# Patient Record
Sex: FEMALE | Race: WHITE | Employment: OTHER | URBAN - METROPOLITAN AREA
[De-identification: names, ages, dates, MRNs, and addresses within clinical notes are randomized per-mention and may not be internally consistent; named-entity substitution may affect disease eponyms.]

---
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
## 2017-05-02 PROBLEM — M25.562 LEFT KNEE PAIN: Status: ACTIVE | Noted: 2017-05-02

## 2017-05-09 PROBLEM — G20 PARKINSON'S DISEASE (HCC): Status: ACTIVE | Noted: 2017-05-09

## 2017-05-09 PROBLEM — G20.A1 PARKINSON'S DISEASE: Status: ACTIVE | Noted: 2017-05-09

## 2018-05-01 ENCOUNTER — HOSPITAL ENCOUNTER (OUTPATIENT)
Age: 83
Discharge: HOME | End: 2018-05-03
Payer: MEDICARE

## 2018-05-01 ENCOUNTER — NURSE ONLY (OUTPATIENT)
Dept: FAMILY MEDICINE CLINIC | Age: 83
End: 2018-05-01
Payer: MEDICARE

## 2018-05-01 DIAGNOSIS — I10 ESSENTIAL HYPERTENSION: ICD-10-CM

## 2018-05-01 DIAGNOSIS — E78.5 HYPERLIPIDEMIA, UNSPECIFIED HYPERLIPIDEMIA TYPE: ICD-10-CM

## 2018-05-01 LAB
ALBUMIN SERPL-MCNC: 4.1 G/DL (ref 3.5–5.2)
ALP BLD-CCNC: 58 U/L (ref 35–104)
ALT SERPL-CCNC: <5 U/L (ref 0–32)
ANION GAP SERPL CALCULATED.3IONS-SCNC: 15 MMOL/L (ref 7–16)
AST SERPL-CCNC: 26 U/L (ref 0–31)
BASOPHILS ABSOLUTE: 0.05 E9/L (ref 0–0.2)
BASOPHILS RELATIVE PERCENT: 0.3 % (ref 0–2)
BILIRUB SERPL-MCNC: 0.5 MG/DL (ref 0–1.2)
BUN BLDV-MCNC: 14 MG/DL (ref 8–23)
CALCIUM SERPL-MCNC: 9.5 MG/DL (ref 8.6–10.2)
CHLORIDE BLD-SCNC: 97 MMOL/L (ref 98–107)
CHOLESTEROL, TOTAL: 153 MG/DL (ref 0–199)
CO2: 27 MMOL/L (ref 22–29)
CREAT SERPL-MCNC: 0.8 MG/DL (ref 0.5–1)
EOSINOPHILS ABSOLUTE: 0.16 E9/L (ref 0.05–0.5)
EOSINOPHILS RELATIVE PERCENT: 1.1 % (ref 0–6)
GFR AFRICAN AMERICAN: >60
GFR NON-AFRICAN AMERICAN: >60 ML/MIN/1.73
GLUCOSE BLD-MCNC: 91 MG/DL (ref 74–109)
HCT VFR BLD CALC: 37.3 % (ref 34–48)
HDLC SERPL-MCNC: 69 MG/DL
HEMOGLOBIN: 11.8 G/DL (ref 11.5–15.5)
IMMATURE GRANULOCYTES #: 0.08 E9/L
IMMATURE GRANULOCYTES %: 0.5 % (ref 0–5)
LDL CHOLESTEROL CALCULATED: 68 MG/DL (ref 0–99)
LYMPHOCYTES ABSOLUTE: 0.79 E9/L (ref 1.5–4)
LYMPHOCYTES RELATIVE PERCENT: 5.2 % (ref 20–42)
MCH RBC QN AUTO: 31.6 PG (ref 26–35)
MCHC RBC AUTO-ENTMCNC: 31.6 % (ref 32–34.5)
MCV RBC AUTO: 99.7 FL (ref 80–99.9)
MONOCYTES ABSOLUTE: 1.11 E9/L (ref 0.1–0.95)
MONOCYTES RELATIVE PERCENT: 7.4 % (ref 2–12)
NEUTROPHILS ABSOLUTE: 12.9 E9/L (ref 1.8–7.3)
NEUTROPHILS RELATIVE PERCENT: 85.5 % (ref 43–80)
PDW BLD-RTO: 12.8 FL (ref 11.5–15)
PLATELET # BLD: 225 E9/L (ref 130–450)
PMV BLD AUTO: 9.5 FL (ref 7–12)
POTASSIUM SERPL-SCNC: 4.2 MMOL/L (ref 3.5–5)
RBC # BLD: 3.74 E12/L (ref 3.5–5.5)
SODIUM BLD-SCNC: 139 MMOL/L (ref 132–146)
TOTAL PROTEIN: 7.1 G/DL (ref 6.4–8.3)
TRIGL SERPL-MCNC: 80 MG/DL (ref 0–149)
VITAMIN D 25-HYDROXY: 51 NG/ML (ref 30–100)
VLDLC SERPL CALC-MCNC: 16 MG/DL
WBC # BLD: 15.1 E9/L (ref 4.5–11.5)

## 2018-05-01 PROCEDURE — 80061 LIPID PANEL: CPT

## 2018-05-01 PROCEDURE — 82306 VITAMIN D 25 HYDROXY: CPT

## 2018-05-01 PROCEDURE — 85025 COMPLETE CBC W/AUTO DIFF WBC: CPT

## 2018-05-01 PROCEDURE — 80053 COMPREHEN METABOLIC PANEL: CPT

## 2018-05-01 PROCEDURE — 36415 COLL VENOUS BLD VENIPUNCTURE: CPT | Performed by: FAMILY MEDICINE

## 2018-05-03 ENCOUNTER — TELEPHONE (OUTPATIENT)
Dept: FAMILY MEDICINE CLINIC | Age: 83
End: 2018-05-03

## 2018-05-08 ENCOUNTER — OFFICE VISIT (OUTPATIENT)
Dept: FAMILY MEDICINE CLINIC | Age: 83
End: 2018-05-08
Payer: MEDICARE

## 2018-05-08 VITALS
TEMPERATURE: 98.5 F | SYSTOLIC BLOOD PRESSURE: 139 MMHG | RESPIRATION RATE: 18 BRPM | HEART RATE: 76 BPM | HEIGHT: 62 IN | WEIGHT: 119 LBS | DIASTOLIC BLOOD PRESSURE: 72 MMHG | OXYGEN SATURATION: 100 % | BODY MASS INDEX: 21.9 KG/M2

## 2018-05-08 DIAGNOSIS — E78.5 HYPERLIPIDEMIA, UNSPECIFIED HYPERLIPIDEMIA TYPE: ICD-10-CM

## 2018-05-08 DIAGNOSIS — R19.7 DIARRHEA OF PRESUMED INFECTIOUS ORIGIN: Primary | ICD-10-CM

## 2018-05-08 DIAGNOSIS — D72.829 LEUKOCYTOSIS, UNSPECIFIED TYPE: ICD-10-CM

## 2018-05-08 DIAGNOSIS — I10 ESSENTIAL HYPERTENSION: ICD-10-CM

## 2018-05-08 DIAGNOSIS — E55.9 VITAMIN D DEFICIENCY: ICD-10-CM

## 2018-05-08 DIAGNOSIS — G20 PARKINSON'S DISEASE (HCC): ICD-10-CM

## 2018-05-08 PROCEDURE — G8420 CALC BMI NORM PARAMETERS: HCPCS | Performed by: FAMILY MEDICINE

## 2018-05-08 PROCEDURE — 4040F PNEUMOC VAC/ADMIN/RCVD: CPT | Performed by: FAMILY MEDICINE

## 2018-05-08 PROCEDURE — G8427 DOCREV CUR MEDS BY ELIG CLIN: HCPCS | Performed by: FAMILY MEDICINE

## 2018-05-08 PROCEDURE — 1123F ACP DISCUSS/DSCN MKR DOCD: CPT | Performed by: FAMILY MEDICINE

## 2018-05-08 PROCEDURE — G8399 PT W/DXA RESULTS DOCUMENT: HCPCS | Performed by: FAMILY MEDICINE

## 2018-05-08 PROCEDURE — 99214 OFFICE O/P EST MOD 30 MIN: CPT | Performed by: FAMILY MEDICINE

## 2018-05-08 PROCEDURE — 1090F PRES/ABSN URINE INCON ASSESS: CPT | Performed by: FAMILY MEDICINE

## 2018-05-08 PROCEDURE — 1036F TOBACCO NON-USER: CPT | Performed by: FAMILY MEDICINE
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
## 2018-06-12 ENCOUNTER — OFFICE VISIT (OUTPATIENT)
Dept: FAMILY MEDICINE CLINIC | Age: 83
End: 2018-06-12
Payer: MEDICARE

## 2018-06-12 VITALS
DIASTOLIC BLOOD PRESSURE: 77 MMHG | SYSTOLIC BLOOD PRESSURE: 131 MMHG | BODY MASS INDEX: 21.71 KG/M2 | WEIGHT: 118 LBS | RESPIRATION RATE: 18 BRPM | HEART RATE: 72 BPM | HEIGHT: 62 IN | OXYGEN SATURATION: 98 % | TEMPERATURE: 98.4 F

## 2018-06-12 DIAGNOSIS — J20.9 ACUTE BRONCHITIS, UNSPECIFIED ORGANISM: Primary | ICD-10-CM

## 2018-06-12 PROCEDURE — G8399 PT W/DXA RESULTS DOCUMENT: HCPCS | Performed by: FAMILY MEDICINE

## 2018-06-12 PROCEDURE — 1036F TOBACCO NON-USER: CPT | Performed by: FAMILY MEDICINE

## 2018-06-12 PROCEDURE — 1090F PRES/ABSN URINE INCON ASSESS: CPT | Performed by: FAMILY MEDICINE

## 2018-06-12 PROCEDURE — G8420 CALC BMI NORM PARAMETERS: HCPCS | Performed by: FAMILY MEDICINE

## 2018-06-12 PROCEDURE — G8427 DOCREV CUR MEDS BY ELIG CLIN: HCPCS | Performed by: FAMILY MEDICINE

## 2018-06-12 PROCEDURE — 99213 OFFICE O/P EST LOW 20 MIN: CPT | Performed by: FAMILY MEDICINE

## 2018-06-12 PROCEDURE — 4040F PNEUMOC VAC/ADMIN/RCVD: CPT | Performed by: FAMILY MEDICINE

## 2018-06-12 PROCEDURE — 1123F ACP DISCUSS/DSCN MKR DOCD: CPT | Performed by: FAMILY MEDICINE

## 2018-06-12 RX ORDER — LEVOFLOXACIN 250 MG/1
250 TABLET ORAL DAILY
Qty: 7 TABLET | Refills: 0 | Status: SHIPPED | OUTPATIENT
Start: 2018-06-12 | End: 2018-06-19

## 2018-06-12 RX ORDER — METHYLPREDNISOLONE 4 MG/1
TABLET ORAL
Qty: 1 KIT | Refills: 0 | Status: SHIPPED | OUTPATIENT
Start: 2018-06-12 | End: 2018-06-18

## 2018-09-14 ENCOUNTER — OFFICE VISIT (OUTPATIENT)
Dept: FAMILY MEDICINE CLINIC | Age: 83
End: 2018-09-14
Payer: MEDICARE

## 2018-09-14 VITALS
WEIGHT: 115 LBS | HEIGHT: 62 IN | HEART RATE: 77 BPM | OXYGEN SATURATION: 100 % | DIASTOLIC BLOOD PRESSURE: 74 MMHG | RESPIRATION RATE: 18 BRPM | TEMPERATURE: 98.1 F | BODY MASS INDEX: 21.16 KG/M2 | SYSTOLIC BLOOD PRESSURE: 148 MMHG

## 2018-09-14 DIAGNOSIS — I10 ESSENTIAL HYPERTENSION: Primary | ICD-10-CM

## 2018-09-14 DIAGNOSIS — G47.9 DIFFICULTY SLEEPING: ICD-10-CM

## 2018-09-14 DIAGNOSIS — E78.5 HYPERLIPIDEMIA, UNSPECIFIED HYPERLIPIDEMIA TYPE: ICD-10-CM

## 2018-09-14 DIAGNOSIS — R73.01 IMPAIRED FASTING GLUCOSE: ICD-10-CM

## 2018-09-14 DIAGNOSIS — R06.02 SOB (SHORTNESS OF BREATH): ICD-10-CM

## 2018-09-14 PROCEDURE — G8427 DOCREV CUR MEDS BY ELIG CLIN: HCPCS | Performed by: FAMILY MEDICINE

## 2018-09-14 PROCEDURE — 1101F PT FALLS ASSESS-DOCD LE1/YR: CPT | Performed by: FAMILY MEDICINE

## 2018-09-14 PROCEDURE — 1123F ACP DISCUSS/DSCN MKR DOCD: CPT | Performed by: FAMILY MEDICINE

## 2018-09-14 PROCEDURE — 1090F PRES/ABSN URINE INCON ASSESS: CPT | Performed by: FAMILY MEDICINE

## 2018-09-14 PROCEDURE — 1036F TOBACCO NON-USER: CPT | Performed by: FAMILY MEDICINE

## 2018-09-14 PROCEDURE — G8420 CALC BMI NORM PARAMETERS: HCPCS | Performed by: FAMILY MEDICINE

## 2018-09-14 PROCEDURE — 93000 ELECTROCARDIOGRAM COMPLETE: CPT | Performed by: FAMILY MEDICINE

## 2018-09-14 PROCEDURE — G8399 PT W/DXA RESULTS DOCUMENT: HCPCS | Performed by: FAMILY MEDICINE

## 2018-09-14 PROCEDURE — 4040F PNEUMOC VAC/ADMIN/RCVD: CPT | Performed by: FAMILY MEDICINE

## 2018-09-14 PROCEDURE — 99214 OFFICE O/P EST MOD 30 MIN: CPT | Performed by: FAMILY MEDICINE

## 2018-09-14 RX ORDER — ALBUTEROL SULFATE 90 UG/1
2 AEROSOL, METERED RESPIRATORY (INHALATION) EVERY 6 HOURS PRN
Qty: 1 INHALER | Refills: 3 | Status: SHIPPED | OUTPATIENT
Start: 2018-09-14 | End: 2019-10-07 | Stop reason: SDUPTHER

## 2018-09-14 RX ORDER — FLUTICASONE FUROATE AND VILANTEROL 200; 25 UG/1; UG/1
1 POWDER RESPIRATORY (INHALATION) DAILY
Qty: 60 EACH | Refills: 3 | Status: SHIPPED | OUTPATIENT
Start: 2018-09-14 | End: 2018-10-08

## 2018-09-14 ASSESSMENT — PATIENT HEALTH QUESTIONNAIRE - PHQ9
2. FEELING DOWN, DEPRESSED OR HOPELESS: 0
1. LITTLE INTEREST OR PLEASURE IN DOING THINGS: 0
SUM OF ALL RESPONSES TO PHQ QUESTIONS 1-9: 0
SUM OF ALL RESPONSES TO PHQ9 QUESTIONS 1 & 2: 0
SUM OF ALL RESPONSES TO PHQ QUESTIONS 1-9: 0

## 2018-09-14 NOTE — PROGRESS NOTES
undiagnosed pathology, especially cancer, as well as protecting against potentially harmful/life threatening disease. Patient and/or guardian verbalizes understanding and agrees with above counseling, assessment and plan. All questions answered.

## 2018-09-15 ENCOUNTER — HOSPITAL ENCOUNTER (OUTPATIENT)
Age: 83
Discharge: HOME | End: 2018-09-15
Payer: MEDICARE

## 2018-09-15 DIAGNOSIS — E55.9 VITAMIN D DEFICIENCY: ICD-10-CM

## 2018-09-15 DIAGNOSIS — E78.5 HYPERLIPIDEMIA, UNSPECIFIED HYPERLIPIDEMIA TYPE: ICD-10-CM

## 2018-09-15 DIAGNOSIS — R73.01 IMPAIRED FASTING GLUCOSE: ICD-10-CM

## 2018-09-15 DIAGNOSIS — R06.02 SOB (SHORTNESS OF BREATH): ICD-10-CM

## 2018-09-15 DIAGNOSIS — I10 ESSENTIAL HYPERTENSION: ICD-10-CM

## 2018-09-15 LAB
ALBUMIN SERPL-MCNC: 4.4 G/DL (ref 3.5–5.2)
ALP BLD-CCNC: 67 U/L (ref 35–104)
ALT SERPL-CCNC: 15 U/L (ref 0–32)
ANION GAP SERPL CALCULATED.3IONS-SCNC: 11 MMOL/L (ref 7–16)
AST SERPL-CCNC: 29 U/L (ref 0–31)
BASOPHILS ABSOLUTE: 0.03 E9/L (ref 0–0.2)
BASOPHILS RELATIVE PERCENT: 0.4 % (ref 0–2)
BILIRUB SERPL-MCNC: 0.5 MG/DL (ref 0–1.2)
BUN BLDV-MCNC: 14 MG/DL (ref 8–23)
CALCIUM SERPL-MCNC: 9.7 MG/DL (ref 8.6–10.2)
CHLORIDE BLD-SCNC: 98 MMOL/L (ref 98–107)
CHOLESTEROL, TOTAL: 171 MG/DL (ref 0–199)
CO2: 29 MMOL/L (ref 22–29)
CREAT SERPL-MCNC: 0.7 MG/DL (ref 0.5–1)
EOSINOPHILS ABSOLUTE: 0.12 E9/L (ref 0.05–0.5)
EOSINOPHILS RELATIVE PERCENT: 1.8 % (ref 0–6)
GFR AFRICAN AMERICAN: >60
GFR NON-AFRICAN AMERICAN: >60 ML/MIN/1.73
GLUCOSE BLD-MCNC: 99 MG/DL (ref 74–109)
HBA1C MFR BLD: 5.6 % (ref 4–5.6)
HCT VFR BLD CALC: 37.8 % (ref 34–48)
HDLC SERPL-MCNC: 85 MG/DL
HEMOGLOBIN: 12.5 G/DL (ref 11.5–15.5)
IMMATURE GRANULOCYTES #: 0.03 E9/L
IMMATURE GRANULOCYTES %: 0.4 % (ref 0–5)
LDL CHOLESTEROL CALCULATED: 65 MG/DL (ref 0–99)
LYMPHOCYTES ABSOLUTE: 1.03 E9/L (ref 1.5–4)
LYMPHOCYTES RELATIVE PERCENT: 15.1 % (ref 20–42)
MCH RBC QN AUTO: 31.5 PG (ref 26–35)
MCHC RBC AUTO-ENTMCNC: 33.1 % (ref 32–34.5)
MCV RBC AUTO: 95.2 FL (ref 80–99.9)
MONOCYTES ABSOLUTE: 0.49 E9/L (ref 0.1–0.95)
MONOCYTES RELATIVE PERCENT: 7.2 % (ref 2–12)
NEUTROPHILS ABSOLUTE: 5.12 E9/L (ref 1.8–7.3)
NEUTROPHILS RELATIVE PERCENT: 75.1 % (ref 43–80)
PDW BLD-RTO: 12.4 FL (ref 11.5–15)
PLATELET # BLD: 199 E9/L (ref 130–450)
PMV BLD AUTO: 9.7 FL (ref 7–12)
POTASSIUM SERPL-SCNC: 4.5 MMOL/L (ref 3.5–5)
PRO-BNP: 186 PG/ML (ref 0–450)
RBC # BLD: 3.97 E12/L (ref 3.5–5.5)
SODIUM BLD-SCNC: 138 MMOL/L (ref 132–146)
T4 FREE: 1.37 NG/DL (ref 0.93–1.7)
TOTAL PROTEIN: 7.5 G/DL (ref 6.4–8.3)
TRIGL SERPL-MCNC: 105 MG/DL (ref 0–149)
TSH SERPL DL<=0.05 MIU/L-ACNC: 4.97 UIU/ML (ref 0.27–4.2)
VITAMIN D 25-HYDROXY: 60 NG/ML (ref 30–100)
VLDLC SERPL CALC-MCNC: 21 MG/DL
WBC # BLD: 6.8 E9/L (ref 4.5–11.5)

## 2018-09-15 PROCEDURE — 80053 COMPREHEN METABOLIC PANEL: CPT

## 2018-09-15 PROCEDURE — 83036 HEMOGLOBIN GLYCOSYLATED A1C: CPT

## 2018-09-15 PROCEDURE — 84439 ASSAY OF FREE THYROXINE: CPT

## 2018-09-15 PROCEDURE — 36415 COLL VENOUS BLD VENIPUNCTURE: CPT

## 2018-09-15 PROCEDURE — 83880 ASSAY OF NATRIURETIC PEPTIDE: CPT

## 2018-09-15 PROCEDURE — 82306 VITAMIN D 25 HYDROXY: CPT

## 2018-09-15 PROCEDURE — 85025 COMPLETE CBC W/AUTO DIFF WBC: CPT

## 2018-09-15 PROCEDURE — 80061 LIPID PANEL: CPT

## 2018-09-15 PROCEDURE — 84443 ASSAY THYROID STIM HORMONE: CPT

## 2018-09-17 RX ORDER — LEVOTHYROXINE SODIUM 0.03 MG/1
25 TABLET ORAL DAILY
Qty: 30 TABLET | Refills: 3 | Status: SHIPPED | OUTPATIENT
Start: 2018-09-17 | End: 2018-10-23

## 2018-09-21 ENCOUNTER — TELEPHONE (OUTPATIENT)
Dept: FAMILY MEDICINE CLINIC | Age: 83
End: 2018-09-21

## 2018-09-21 DIAGNOSIS — R06.02 SOB (SHORTNESS OF BREATH): Primary | ICD-10-CM

## 2018-09-21 NOTE — TELEPHONE ENCOUNTER
Patient doesn't feel the brio and inhaler are working well(in house or resting, okay)any time going outside she is short of breath

## 2018-10-05 ENCOUNTER — HOSPITAL ENCOUNTER (OUTPATIENT)
Dept: CARDIOLOGY | Age: 83
Discharge: HOME | End: 2018-10-05
Payer: MEDICARE

## 2018-10-05 VITALS
WEIGHT: 115 LBS | DIASTOLIC BLOOD PRESSURE: 78 MMHG | HEART RATE: 93 BPM | SYSTOLIC BLOOD PRESSURE: 118 MMHG | HEIGHT: 62 IN | BODY MASS INDEX: 21.16 KG/M2

## 2018-10-05 DIAGNOSIS — R06.02 SOB (SHORTNESS OF BREATH): ICD-10-CM

## 2018-10-05 DIAGNOSIS — R06.02 SHORTNESS OF BREATH: Primary | ICD-10-CM

## 2018-10-05 LAB
LV EF: 68 %
LVEF MODALITY: NORMAL

## 2018-10-05 PROCEDURE — 6360000002 HC RX W HCPCS: Performed by: FAMILY MEDICINE

## 2018-10-05 PROCEDURE — A9500 TC99M SESTAMIBI: HCPCS | Performed by: INTERNAL MEDICINE

## 2018-10-05 PROCEDURE — 93017 CV STRESS TEST TRACING ONLY: CPT

## 2018-10-05 PROCEDURE — 3430000000 HC RX DIAGNOSTIC RADIOPHARMACEUTICAL: Performed by: INTERNAL MEDICINE

## 2018-10-05 PROCEDURE — 93306 TTE W/DOPPLER COMPLETE: CPT

## 2018-10-05 PROCEDURE — 2580000003 HC RX 258: Performed by: INTERNAL MEDICINE

## 2018-10-05 PROCEDURE — 78452 HT MUSCLE IMAGE SPECT MULT: CPT

## 2018-10-05 RX ORDER — SODIUM CHLORIDE 0.9 % (FLUSH) 0.9 %
10 SYRINGE (ML) INJECTION PRN
Status: DISCONTINUED | OUTPATIENT
Start: 2018-10-05 | End: 2018-10-06 | Stop reason: HOSPADM

## 2018-10-05 RX ADMIN — Medication 10 ML: at 08:56

## 2018-10-05 RX ADMIN — Medication 10 ML: at 10:23

## 2018-10-05 RX ADMIN — Medication 30.2 MILLICURIE: at 10:22

## 2018-10-05 RX ADMIN — Medication 10 ML: at 10:22

## 2018-10-05 RX ADMIN — REGADENOSON 0.4 MG: 0.08 INJECTION, SOLUTION INTRAVENOUS at 10:22

## 2018-10-05 RX ADMIN — Medication 10.3 MILLICURIE: at 08:55

## 2018-10-05 NOTE — PROCEDURES
[address] [facility]   [address] [place] [address]  [phone]                Pharmacologic Stress Nuclear Gated SPECT Study    Name: [facility] Account Number: [de-identified]    :  1935          Sex: female         Date of Study:  10/5/2018    Height: 5' 2\" (157.5 cm)         Weight: 115 lb (52.2 kg)     Ordering Provider: Droa Neves MD          PCP: Dora Neves MD      Cardiologist: none              Interpreting Physician: Gerda Yoo MD  _________________________________________________________________________________    Indication:   Detecting the presence and location of coronary artery disease    Clinical History:   Patient has no known history of coronary artery disease. Resting ECG:    NM int 0,16 sec, QRS int 0.08 sec, QT int  sec; HR 71 bpm  Normal sinus Normal EKG    Procedure:   Pharmacologic stress testing was performed with regadenoson 0.4 mg for 15 seconds. .  The heart rate was 71 at baseline and alonso to 96 beats during the infusion. This corresponds to 70% of maximum predicted heart rate. The blood pressure at baseline was 120/78 and blood pressure at the end of infusion was 118/78. Blood pressure response was normal during the stress procedure. The patient experienced shortness of breath  during the infusion, which relieved. ECG during the infusion did not change. IMAGING: Myocardial perfusion imaging was performed at rest 30-35 minutes following the intravenous injection of 10.3 mCi of (Tc-Sestamibi) followed by 10 ml of Normal Saline. As per infusion protocol, the patient was injected intravenously with 30.2 mCi of (Tc-Sestamibi) followed by 10 ml of Normal Saline. Gated post-stress tomographic imaging was performed 20-25 minutes after stress.      FINDINGS: The overall quality of the study was good    Left ventricular cavity size was noted to be normal.    Rotational analog

## 2018-10-08 DIAGNOSIS — R06.02 SOB (SHORTNESS OF BREATH): Primary | ICD-10-CM

## 2018-10-08 RX ORDER — METHYLPREDNISOLONE 4 MG/1
TABLET ORAL
Qty: 1 KIT | Refills: 0 | Status: SHIPPED | OUTPATIENT
Start: 2018-10-08 | End: 2018-10-14

## 2018-10-15 ENCOUNTER — HOSPITAL ENCOUNTER (OUTPATIENT)
Dept: PULMONOLOGY | Age: 83
Discharge: HOME | End: 2018-10-15
Payer: MEDICARE

## 2018-10-15 DIAGNOSIS — R06.02 SOB (SHORTNESS OF BREATH): ICD-10-CM

## 2018-10-15 PROCEDURE — 94729 DIFFUSING CAPACITY: CPT

## 2018-10-15 PROCEDURE — 94726 PLETHYSMOGRAPHY LUNG VOLUMES: CPT

## 2018-10-15 PROCEDURE — 94060 EVALUATION OF WHEEZING: CPT

## 2018-10-16 ENCOUNTER — HOSPITAL ENCOUNTER (OUTPATIENT)
Age: 83
Discharge: HOME | End: 2018-10-18
Payer: MEDICARE

## 2018-10-16 ENCOUNTER — NURSE ONLY (OUTPATIENT)
Dept: FAMILY MEDICINE CLINIC | Age: 83
End: 2018-10-16
Payer: MEDICARE

## 2018-10-16 DIAGNOSIS — I10 ESSENTIAL HYPERTENSION: ICD-10-CM

## 2018-10-16 DIAGNOSIS — E78.5 HYPERLIPIDEMIA, UNSPECIFIED HYPERLIPIDEMIA TYPE: ICD-10-CM

## 2018-10-16 DIAGNOSIS — E03.9 HYPOTHYROIDISM, UNSPECIFIED TYPE: Primary | ICD-10-CM

## 2018-10-16 PROCEDURE — 84439 ASSAY OF FREE THYROXINE: CPT

## 2018-10-16 PROCEDURE — 84443 ASSAY THYROID STIM HORMONE: CPT

## 2018-10-16 PROCEDURE — 36415 COLL VENOUS BLD VENIPUNCTURE: CPT | Performed by: FAMILY MEDICINE

## 2018-10-17 DIAGNOSIS — E03.9 HYPOTHYROIDISM, UNSPECIFIED TYPE: ICD-10-CM

## 2018-10-17 LAB
T4 FREE: 1.61 NG/DL (ref 0.93–1.7)
TSH SERPL DL<=0.05 MIU/L-ACNC: 2 UIU/ML (ref 0.27–4.2)

## 2018-10-23 ENCOUNTER — OFFICE VISIT (OUTPATIENT)
Dept: FAMILY MEDICINE CLINIC | Age: 83
End: 2018-10-23
Payer: MEDICARE

## 2018-10-23 VITALS
HEART RATE: 73 BPM | OXYGEN SATURATION: 100 % | DIASTOLIC BLOOD PRESSURE: 73 MMHG | SYSTOLIC BLOOD PRESSURE: 113 MMHG | BODY MASS INDEX: 20.76 KG/M2 | WEIGHT: 112.8 LBS | RESPIRATION RATE: 18 BRPM | TEMPERATURE: 97.8 F | HEIGHT: 62 IN

## 2018-10-23 DIAGNOSIS — E78.5 HYPERLIPIDEMIA, UNSPECIFIED HYPERLIPIDEMIA TYPE: ICD-10-CM

## 2018-10-23 DIAGNOSIS — F41.9 ANXIETY: ICD-10-CM

## 2018-10-23 DIAGNOSIS — G25.81 RESTLESS LEG SYNDROME: ICD-10-CM

## 2018-10-23 DIAGNOSIS — R06.02 SOB (SHORTNESS OF BREATH): ICD-10-CM

## 2018-10-23 DIAGNOSIS — G20 PARKINSON'S DISEASE (HCC): Primary | ICD-10-CM

## 2018-10-23 DIAGNOSIS — I10 ESSENTIAL HYPERTENSION: ICD-10-CM

## 2018-10-23 PROCEDURE — 4040F PNEUMOC VAC/ADMIN/RCVD: CPT | Performed by: FAMILY MEDICINE

## 2018-10-23 PROCEDURE — 99214 OFFICE O/P EST MOD 30 MIN: CPT | Performed by: FAMILY MEDICINE

## 2018-10-23 PROCEDURE — 1090F PRES/ABSN URINE INCON ASSESS: CPT | Performed by: FAMILY MEDICINE

## 2018-10-23 PROCEDURE — G8420 CALC BMI NORM PARAMETERS: HCPCS | Performed by: FAMILY MEDICINE

## 2018-10-23 PROCEDURE — G8482 FLU IMMUNIZE ORDER/ADMIN: HCPCS | Performed by: FAMILY MEDICINE

## 2018-10-23 PROCEDURE — G8427 DOCREV CUR MEDS BY ELIG CLIN: HCPCS | Performed by: FAMILY MEDICINE

## 2018-10-23 PROCEDURE — 1036F TOBACCO NON-USER: CPT | Performed by: FAMILY MEDICINE

## 2018-10-23 PROCEDURE — 1123F ACP DISCUSS/DSCN MKR DOCD: CPT | Performed by: FAMILY MEDICINE

## 2018-10-23 PROCEDURE — 1101F PT FALLS ASSESS-DOCD LE1/YR: CPT | Performed by: FAMILY MEDICINE

## 2018-10-23 PROCEDURE — G8399 PT W/DXA RESULTS DOCUMENT: HCPCS | Performed by: FAMILY MEDICINE

## 2018-10-23 NOTE — PROGRESS NOTES
clubbing, cyanosis or edema  Skin: unremarkable    Assessment/Plan:      Ruma was seen today for hypertension and discuss labs. Diagnoses and all orders for this visit:    Parkinson's disease ([address]    Restless leg syndrome    Anxiety    SOB (shortness of breath)    Essential hypertension    Hyperlipidemia, unspecified hyperlipidemia type    All test results reviewed with pt and daughter. I put out a call to her neurologist for medication recommendations for above symptoms as it appears to be symptoms associated with her Parkinsons. She has neuro appt on 10/29/18. As above. Call or go to ED immediately if symptoms worsen or persist.  No Follow-up on file. , or sooner if necessary. Educational materials and/or home exercises printed for patient's review and were included in patient instructions on his/her After Visit Summary and given to patient at the end of visit. Counseled regarding above diagnosis, including possible risks and complications,  especially if left uncontrolled. Counseled regarding the possible side effects, risks, benefits and alternatives to treatment; patient and/or guardian verbalizes understanding, agrees, feels comfortable with and wishes to proceed with above treatment plan. Advised patient to call with any new medication issues, and read all Rx info from pharmacy to assure aware of all possible risks and side effects of medication before taking. Reviewed age and gender appropriate health screening exams and vaccinations. Advised patient regarding importance of keeping up with recommended health maintenance and to schedule as soon as possible if overdue, as this is important in assessing for undiagnosed pathology, especially cancer, as well as protecting against potentially harmful/life threatening disease. Patient and/or guardian verbalizes understanding and agrees with above counseling, assessment and plan. All questions answered.

## 2018-12-10 RX ORDER — LOSARTAN POTASSIUM AND HYDROCHLOROTHIAZIDE 12.5; 1 MG/1; MG/1
TABLET ORAL
Qty: 90 TABLET | Refills: 1 | Status: SHIPPED | OUTPATIENT
Start: 2018-12-10 | End: 2019-05-14 | Stop reason: SDUPTHER

## 2019-01-24 RX ORDER — SIMVASTATIN 40 MG
TABLET ORAL
Qty: 90 TABLET | Refills: 1 | Status: SHIPPED | OUTPATIENT
Start: 2019-01-24 | End: 2019-05-14 | Stop reason: SDUPTHER

## 2019-03-06 ENCOUNTER — TELEPHONE (OUTPATIENT)
Dept: FAMILY MEDICINE CLINIC | Age: 84
End: 2019-03-06

## 2019-03-18 ENCOUNTER — OFFICE VISIT (OUTPATIENT)
Dept: FAMILY MEDICINE CLINIC | Age: 84
End: 2019-03-18
Payer: MEDICARE

## 2019-03-18 VITALS
HEIGHT: 62 IN | DIASTOLIC BLOOD PRESSURE: 70 MMHG | BODY MASS INDEX: 21.12 KG/M2 | OXYGEN SATURATION: 100 % | RESPIRATION RATE: 16 BRPM | TEMPERATURE: 98.5 F | SYSTOLIC BLOOD PRESSURE: 121 MMHG | WEIGHT: 114.8 LBS | HEART RATE: 68 BPM

## 2019-03-18 DIAGNOSIS — Z23 NEED FOR PNEUMOCOCCAL VACCINATION: Primary | ICD-10-CM

## 2019-03-18 DIAGNOSIS — T17.908A ASPIRATION INTO AIRWAY, INITIAL ENCOUNTER: ICD-10-CM

## 2019-03-18 DIAGNOSIS — E55.9 VITAMIN D DEFICIENCY: ICD-10-CM

## 2019-03-18 DIAGNOSIS — G20 PARKINSON'S DISEASE (HCC): ICD-10-CM

## 2019-03-18 DIAGNOSIS — Z00.00 ROUTINE GENERAL MEDICAL EXAMINATION AT A HEALTH CARE FACILITY: ICD-10-CM

## 2019-03-18 DIAGNOSIS — E03.9 HYPOTHYROIDISM, UNSPECIFIED TYPE: ICD-10-CM

## 2019-03-18 DIAGNOSIS — E78.5 HYPERLIPIDEMIA, UNSPECIFIED HYPERLIPIDEMIA TYPE: ICD-10-CM

## 2019-03-18 PROCEDURE — G8482 FLU IMMUNIZE ORDER/ADMIN: HCPCS | Performed by: FAMILY MEDICINE

## 2019-03-18 PROCEDURE — G0438 PPPS, INITIAL VISIT: HCPCS | Performed by: FAMILY MEDICINE

## 2019-03-18 PROCEDURE — 4040F PNEUMOC VAC/ADMIN/RCVD: CPT | Performed by: FAMILY MEDICINE

## 2019-03-18 RX ORDER — PAROXETINE 10 MG/1
10 TABLET, FILM COATED ORAL EVERY MORNING
Refills: 0 | COMMUNITY
Start: 2019-02-21

## 2019-03-18 ASSESSMENT — LIFESTYLE VARIABLES
HAS A RELATIVE, FRIEND, DOCTOR, OR ANOTHER HEALTH PROFESSIONAL EXPRESSED CONCERN ABOUT YOUR DRINKING OR SUGGESTED YOU CUT DOWN: 0
HOW OFTEN DO YOU HAVE SIX OR MORE DRINKS ON ONE OCCASION: 0
HOW OFTEN DURING THE LAST YEAR HAVE YOU BEEN UNABLE TO REMEMBER WHAT HAPPENED THE NIGHT BEFORE BECAUSE YOU HAD BEEN DRINKING: 0
HOW OFTEN DURING THE LAST YEAR HAVE YOU FAILED TO DO WHAT WAS NORMALLY EXPECTED FROM YOU BECAUSE OF DRINKING: 0
AUDIT TOTAL SCORE: 3
HAVE YOU OR SOMEONE ELSE BEEN INJURED AS A RESULT OF YOUR DRINKING: 0
HOW OFTEN DURING THE LAST YEAR HAVE YOU NEEDED AN ALCOHOLIC DRINK FIRST THING IN THE MORNING TO GET YOURSELF GOING AFTER A NIGHT OF HEAVY DRINKING: 0
HOW OFTEN DURING THE LAST YEAR HAVE YOU FOUND THAT YOU WERE NOT ABLE TO STOP DRINKING ONCE YOU HAD STARTED: 0
HOW OFTEN DURING THE LAST YEAR HAVE YOU HAD A FEELING OF GUILT OR REMORSE AFTER DRINKING: 0
HOW OFTEN DO YOU HAVE A DRINK CONTAINING ALCOHOL: 3
HOW MANY STANDARD DRINKS CONTAINING ALCOHOL DO YOU HAVE ON A TYPICAL DAY: 0
AUDIT-C TOTAL SCORE: 3

## 2019-03-18 ASSESSMENT — PATIENT HEALTH QUESTIONNAIRE - PHQ9
SUM OF ALL RESPONSES TO PHQ QUESTIONS 1-9: 0
SUM OF ALL RESPONSES TO PHQ QUESTIONS 1-9: 0

## 2019-03-18 ASSESSMENT — ANXIETY QUESTIONNAIRES: GAD7 TOTAL SCORE: 1

## 2019-04-04 ENCOUNTER — HOSPITAL ENCOUNTER (OUTPATIENT)
Dept: GENERAL RADIOLOGY | Age: 84
Discharge: HOME | End: 2019-04-06
Payer: MEDICARE

## 2019-04-04 DIAGNOSIS — T17.908A ASPIRATION INTO AIRWAY, INITIAL ENCOUNTER: ICD-10-CM

## 2019-04-04 PROCEDURE — A4641 RADIOPHARM DX AGENT NOC: HCPCS | Performed by: RADIOLOGY

## 2019-04-04 PROCEDURE — 2500000003 HC RX 250 WO HCPCS: Performed by: RADIOLOGY

## 2019-04-04 PROCEDURE — 6360000004 HC RX CONTRAST MEDICATION: Performed by: RADIOLOGY

## 2019-04-04 PROCEDURE — 92611 MOTION FLUOROSCOPY/SWALLOW: CPT

## 2019-04-04 PROCEDURE — 74230 X-RAY XM SWLNG FUNCJ C+: CPT

## 2019-04-04 RX ADMIN — BARIUM SULFATE 1 TABLET: 700 TABLET ORAL at 09:48

## 2019-04-04 RX ADMIN — BARIUM SULFATE 88 G: 960 POWDER, FOR SUSPENSION ORAL at 09:47

## 2019-04-04 RX ADMIN — BARIUM SULFATE 45 G: 0.6 CREAM ORAL at 09:47

## 2019-04-04 NOTE — PROGRESS NOTES
SPEECH/LANGUAGE PATHOLOGY  VIDEOFLUOROSCOPIC STUDY OF SWALLOWING ([facility])      PATIENT NAME:  Siria Cason      :  1935      TODAY'S DATE:  2019    SUMMARY OF EVALUATION     DYSPHAGIA DIAGNOSIS:  Mild dysphagia, functional swallow with compensatory strategies     DIET RECOMMENDATIONS: Regular consistency solids with regular consistency liquids, take pills with pudding     COMPENSATORY STRATEGIES:      [x]Double swallow with []all consistencies [x]thin []nectar []honey []pureed []ground []chopped []soft solid []solid       []Multiple swallow (  Times) with []all consistencies []thin []nectar []honey []pureed []ground []chopped []soft solid []solid     []Chin tuck with []all consistencies  []thin []nectar []honey []pureed []ground []chopped []soft solid []solid      []Throat clear after with []all consistencies []thin []nectar []honey []pureed []ground []chopped []soft solid []solid      []Effortful swallow with []all consistencies  []thin []nectar []honey []pureed []ground []chopped []soft solid []solid     []Small bites/sips        []Alternate solids / liquids      []Check for oral pocketing     [x]No straw            []Spoon sip liquids        [x] Take pills in pudding    ASSISTANCE LEVEL:  [x]No assistance required   []Stand by assist   []Full assistance required  []Set up required   []Supervision with all PO intake    [] Malnutrition indicators have been identified and nursing has been notified to ensure a dietary consult is ordered.      THERAPY RECOMMENDATIONS:       []  Therapy is not recommended       [x]  Therapy is recommended to:     []  Improve oral motor strength and range of motion     [x]  Improve tongue base retraction      []  Improve laryngeal strength and range of motion     []  Address cricopharyngeal dysfunction (Shaker Exercises)    []  Mealtime assessment of patient's tolerance of prescribed diet     []  Repeat Video Swallowing Evaluation is recommended and requires a Physician order    []Therapy at the discretion of facility/treating Speech Pathologist                   PROCEDURE     Consistencies Administered During the Evaluation   Liquids: [x]Thin    [x]Nectar   [x]Honey   Solids:  [x]Pureed/Pudding  []Soft Solid   [x]Cookie   Other:       Method of Intake:   [x]Cup   [x]Spoon  []Straw  [x]Self Fed  []Fed by Clinician     Position:   [x]Upright seated  []Upright Standing  []Supine, elevated 45°   []Anterior/Posterior  [x]Lateral   []Oblique                 RESULTS     ORAL STAGE []Functional  [x]Abnormal for pills only   [x] Dentition: ([x]natural  []missing teeth []edentulous []partials []other )     [] Inadequate labial seal resulting anterior labial spillage from ([]right[] left []midline )     [] Decreased mastication due to ([]poor/missing dentition []decreased lingual control []cognitive status)      [x] Delayed A-P transit due to ([]decreased initiation[x] reduced lingual strength[]cognitive function )     [] Decreased bolus formation resulting in premature pharyngeal spillage      [] Oral residuals []anterior sulcus  []sub lingually  []right buccal cavity []left buccal cavity  []on tongue base  []throughout oral cavity []on superior tongue  []on palate  []on velum          []Comments:        PHARYNGEAL STAGE     [] Functional  [x]Abnormal       ONSET TIME    [x] Onset time of the pharyngeal swallow was adequate      [] Delayed initiation of the pharyngeal swallow ([]mild []moderate []marked []severe []absent ).        Swallow reflex was triggered at: ([]tongue base []valleculae []pyriform sinus)      PHARYNGEAL RESIDUALS          Vallecula/Pharyngeal Wall    []No significant residuals were noted in the vallecula      [x]Reduced tongue base retraction resulting in: ([x]residuals in vallecula []residual on posterior pharyngeal wall)    These residuals were noted for ([x]thin []nectar[] honey []pureed []solid)      which( [x]cleared  []did not clear  []partially cleared []mostly cleared)       with ([x]cued []spontaneous [x]double swallow []multiple swallow (  times)  []liquid chaser)      []Residuals in the vallecula were noted due to inadequate epiglottic inversion        Pyriform Sinuses    [x]No significant residuals were noted in the pyriform sinuses      [] Residuals in the pyriform sinuses were noted due to ([]pharyngeal weakness []cricopharyngeal dysfunction.)       These residuals were noted for ([]thin []nectar []honey []pureed []solid)       which ([]cleared []did not clear  []partially cleared  []mostly cleared)        with ([]cued []spontaneous  []double swallow []multiple swallow (  times) []liquid chaser)    LARYNGEAL PENETRATION   []Laryngeal penetration was not present during this evaluation      []Laryngeal penetration was noted BEFORE the swallow for ([]thin []nectar []honey[] pureed []solid)      due to: [] decreased bolus formation      []premature pharyngeal entry       []delayed pharyngeal swallow           which  []cleared from the laryngeal vestibule spontaneously  (transient)     []cleared from the laryngeal vestibule with a cued, re-directive throat clear       []remained in the laryngeal vestibule. []penetrated deep into the laryngeal vestibule (to the level of the true vocal folds)      Laryngeal penetration was  ([]trace []mild []moderate []marked []severe ) and      occurred ([]inconsistently  []consistently []only with use of a straw). [x] Laryngeal penetration was noted DURING  the swallow for ([x]thin []nectar []honey[] pureed []solid)       due to: []delayed laryngeal closure      [x]inadequate laryngeal closure        which  [x]cleared from the laryngeal vestibule spontaneously  (transient)     []cleared from the laryngeal vestibule with a cued, re-directive throat clear       []remained in the laryngeal vestibule.       []penetrated deep into the laryngeal vestibule (to the level of the true vocal folds)          Laryngeal penetration was ([x]trace []mild []moderate []marked []severe ) and      occurred ([x]inconsistently  []consistently []only with use of a straw). [] Laryngeal penetration was noted AFTER the swallow for ([]thin []nectar []honey[] pureed []solid)          due to: []residuals in laryngeal vestibule       []pharyngeal residual       []redirection of bolus from the esophagus        which  []cleared from the laryngeal vestibule spontaneously  (transient)     []cleared from the laryngeal vestibule with a cued, re-directive throat clear       []remained in the laryngeal vestibule. []penetrated deep into the laryngeal vestibule (to the level of the true vocal folds)      Laryngeal penetration was  ([]trace []mild []moderate []marked []severe ) and      occurred ([]inconsistently  []consistently []only with use of a straw).         In response to laryngeal penetration,  []A  spontaneous cough/throat clear [] an inconsistent  [] a delayed cough       []an absent cough/throat clear was noted     ASPIRATION    [x]Aspiration was not present during this evaluation      []Aspiration BEFORE the swallow was present for ([]thin[] nectar[] honey []pureed []solid)       due to: ([] decreased bolus formation []premature pharyngeal entry []delayed pharyngeal swallow)       []Aspiration DURING the swallow was present for  ([]thin []nectar []honey[] pureed []solid)      due to: ([]delayed laryngeal closure []inadequate laryngeal closure)       []Aspiration AFTER  the swallow was present for ([]thin[] nectar []honey []pureed []solid)      due to:  ([]residuals in laryngeal vestibule []pharyngeal residual []redirection of bolus from the esophagus)      In response to aspiration,  []A  spontaneous cough/throat clear [] an inconsistent/delayed cough      []an absent cough/throat clear was noted     COMPENSATORY STRATEGIES    [x] Compensatory strategies that were beneficial included: []chin tuck [x]double swallow with thin liquids []multiple swallow []alternating solids/liquids          []cued redirective cough []cued throat clear       [] Compensatory strategies that were not beneficial included: []chin tuck []double swallow []multiple swallow []alternating solids/liquids          []cued redirective cough []cued throat clear       [] Compensatory strategies were not attempted. STRUCTURAL/FUNCTIONAL ANOMALIES    [x]No structural/functional anomalies were noted      []Inadequate velopharyngeal closure resulting in nasopharyngeal reflux. [] There was presence of Zenkers Diverticulum per Radiologist        []Comments:       CERVICAL ESOPHAGEAL STAGE : [x]Adequate []Inadequate  []Not Assessed     []Cervical osteophytes present per Radiologist   []Structural/mechanical abnormality in cervical esophagus per Radiologist  [] Redirection of bolus from the esophagus into pharynx                                [x]  Prognosis for improvements is good  []  This plan will be re-evaluated and revised in 1 week  if warranted. [x]  Patient stated goals: to be able to swallow pills and thin liquids better. [x]  Treatment goals discussed with [x] patient/  [] family. [x]  The [x]  patient/ []  family understand the diagnosis, prognosis and plan of care. [x]The admitting diagnosis and active problem list, as listed below have been reviewed prior to initiation of this evaluation.      ADMITTING DIAGNOSIS: Aspiration into airway, initial encounter [T17.408X]     ACTIVE PROBLEM LIST:   Patient Active Problem List   Diagnosis    HTN (hypertension)    Hyperlipidemia    Pain of left lower extremity    Left knee pain    Parkinson's disease ([facility] [address]

## 2019-04-08 DIAGNOSIS — R13.10 DYSPHAGIA, UNSPECIFIED TYPE: Primary | ICD-10-CM

## 2019-04-11 ENCOUNTER — NURSE ONLY (OUTPATIENT)
Dept: FAMILY MEDICINE CLINIC | Age: 84
End: 2019-04-11
Payer: MEDICARE

## 2019-04-11 ENCOUNTER — HOSPITAL ENCOUNTER (OUTPATIENT)
Dept: SPEECH THERAPY | Age: 84
Setting detail: THERAPIES SERIES
Discharge: HOME | End: 2019-04-11
Payer: MEDICARE

## 2019-04-11 ENCOUNTER — HOSPITAL ENCOUNTER (OUTPATIENT)
Age: 84
Discharge: HOME | End: 2019-04-13
Payer: MEDICARE

## 2019-04-11 DIAGNOSIS — E78.5 HYPERLIPIDEMIA, UNSPECIFIED HYPERLIPIDEMIA TYPE: ICD-10-CM

## 2019-04-11 DIAGNOSIS — I10 ESSENTIAL HYPERTENSION: ICD-10-CM

## 2019-04-11 DIAGNOSIS — G20 PARKINSON'S DISEASE (HCC): ICD-10-CM

## 2019-04-11 DIAGNOSIS — E03.9 HYPOTHYROIDISM, UNSPECIFIED TYPE: ICD-10-CM

## 2019-04-11 DIAGNOSIS — E55.9 VITAMIN D DEFICIENCY: ICD-10-CM

## 2019-04-11 LAB
BASOPHILS ABSOLUTE: 0.04 E9/L (ref 0–0.2)
BASOPHILS RELATIVE PERCENT: 0.6 % (ref 0–2)
CHOLESTEROL, TOTAL: 161 MG/DL (ref 0–199)
EOSINOPHILS ABSOLUTE: 0.15 E9/L (ref 0.05–0.5)
EOSINOPHILS RELATIVE PERCENT: 2.3 % (ref 0–6)
HCT VFR BLD CALC: 39.3 % (ref 34–48)
HDLC SERPL-MCNC: 81 MG/DL
HEMOGLOBIN: 12.2 G/DL (ref 11.5–15.5)
IMMATURE GRANULOCYTES #: 0.03 E9/L
IMMATURE GRANULOCYTES %: 0.5 % (ref 0–5)
LDL CHOLESTEROL CALCULATED: 66 MG/DL (ref 0–99)
LYMPHOCYTES ABSOLUTE: 1.09 E9/L (ref 1.5–4)
LYMPHOCYTES RELATIVE PERCENT: 16.9 % (ref 20–42)
MCH RBC QN AUTO: 30.7 PG (ref 26–35)
MCHC RBC AUTO-ENTMCNC: 31 % (ref 32–34.5)
MCV RBC AUTO: 98.7 FL (ref 80–99.9)
MONOCYTES ABSOLUTE: 0.51 E9/L (ref 0.1–0.95)
MONOCYTES RELATIVE PERCENT: 7.9 % (ref 2–12)
NEUTROPHILS ABSOLUTE: 4.62 E9/L (ref 1.8–7.3)
NEUTROPHILS RELATIVE PERCENT: 71.8 % (ref 43–80)
PDW BLD-RTO: 13.2 FL (ref 11.5–15)
PLATELET # BLD: 200 E9/L (ref 130–450)
PMV BLD AUTO: 10 FL (ref 7–12)
RBC # BLD: 3.98 E12/L (ref 3.5–5.5)
TRIGL SERPL-MCNC: 69 MG/DL (ref 0–149)
TSH SERPL DL<=0.05 MIU/L-ACNC: 2.57 UIU/ML (ref 0.27–4.2)
VLDLC SERPL CALC-MCNC: 14 MG/DL
WBC # BLD: 6.4 E9/L (ref 4.5–11.5)

## 2019-04-11 PROCEDURE — 82306 VITAMIN D 25 HYDROXY: CPT

## 2019-04-11 PROCEDURE — 36415 COLL VENOUS BLD VENIPUNCTURE: CPT | Performed by: FAMILY MEDICINE

## 2019-04-11 PROCEDURE — 80061 LIPID PANEL: CPT

## 2019-04-11 PROCEDURE — 85025 COMPLETE CBC W/AUTO DIFF WBC: CPT

## 2019-04-11 PROCEDURE — 84443 ASSAY THYROID STIM HORMONE: CPT

## 2019-04-11 PROCEDURE — 92526 ORAL FUNCTION THERAPY: CPT

## 2019-04-11 NOTE — PROGRESS NOTES
SPEECH LANGUAGE PATHOLOGY  DAILY PROGRESS NOTE      SUBJECTIVE:  Patient seen this date for 60 minute speech therapy session to target dysphagia. Pleasant and cooperative. Her spouse accompanied her to the session. OBJECTIVE:  Reviewed the results and recommendations from her Video Swallow Study that was completed on April 4, 2019. This study revealed a Mild Dysphagia (functional with compensatory strategies) characterized with delayed A-P transit, reduced tongue base retraction resulting in vallecular residuals, and trace laryngeal penetration of thin liquids only which cleared from the laryngeal vestibule spontaneously (no aspiration observed). Following the study a diet of regular solids and thin consistency liquids was recommended, as well as using a double swallow, avoiding straws, and taking her pills with pudding. She reported that since the study she has been taking her pills with pudding and only experienced one sticking episode. She does report inconsistent coughing with liquids. SLP encouraged her to take small, single cup sips and avoid gulping. SLP also provided her with information regarding thickener and provided a demonstration on how to thicken to a nectar consistency. She reported that some days she notices more difficulty than others and SLP explained she could utilize the thickener on those days more if needed. She expressed understanding. Thickener samples given to patient. Introduced her to oral motor, tongue base, and laryngeal strengthening exercises. She was able to complete all exercises given verbal/visual cues. Encouraged hard adduction during laryngeal strengthening exercises. Hand-outs provided to complete daily for carryover. ASSESSMENT:  Patient presents with a Mild Dysphagia per a Video Swallow Study that was completed on April 4, 2019.       PLAN:    SLP provided patient with a home exercise program.  She is going to complete the program for 2 weeks and SLP will call at that time to discuss progress. If formal therapy is needed at that time, new therapy goals will be established. SLP also encouraged patient to monitor her signs and symptoms of dysphagia and undergo a periodic Video Swallow Study every 6 months-1 year secondary to the progressive nature of Parkinson's Disease.           [address] [place] [address] M.S. [address]  4/11/2019

## 2019-04-11 NOTE — PROGRESS NOTES
SPEECH LANGUAGE PATHOLOGY MEDICARE CERTIFICATION       Date: 4/11/2019    Patient: Mylene Merlin    Physician: Dr. Jaxon Martin    MR Number: [id number]    Date of Birth: [birth year]      Certification/Recertification Period: April 11, 2019 through July 11, 2019       Diagnosis (to which the services are applicable): G05.55 Dysphagia, unspecified type     Date of Onset: Video Swallow Study completed April 4, 2019        Objective Functional Deficits: Patient underwent a Video Swallow Study on April 4, 2019. This study revealed a Mild Dysphagia (functional with compensatory strategies) characterized with delayed A-P transit, reduced tongue base retraction resulting in vallecular residuals, and trace laryngeal penetration of thin liquids only which cleared from the laryngeal vestibule spontaneously (no aspiration observed). Following the study a diet of regular solids and thin consistency liquids was recommended, as well as using a double swallow, avoiding straws, and taking her pills with pudding. Patient reported that since the study she has been taking her pills with pudding and only experienced one sticking episode. She does report inconsistent coughing with liquids. SLP encouraged her to take small, single cup sips and avoid gulping. SLP also provided her with information regarding thickener and provided a demonstration on how to thicken to a nectar consistency. She reported that some days she notices more difficulty than others and SLP explained she could utilize the thickener on those days more if needed. She expressed understanding. Thickener samples given to patient. Functional Goals for Three Months: SLP provided patient with a home exercise program consisting of oral motor, tongue base, and laryngeal strengthening exercises to improve tongue base retraction and decrease laryngeal penetration. SLP to follow-up with patient in 2 weeks.   If patient is not making progress, formal therapy will be initiated at that time in the outpatient setting. Estimated Length of Treatment: 3 months       Frequent of Visits: Initial evaluation and phone follow-up       Types of Procedures: Traditional swallow exercises      Rehabilitation Potential: Good       Electronically signed by: Mikel Hurtado M.S. [facility] [address]   Date: 4/11/2019        Please sign below and return by fax to [phone]. If you have any questions or concerns, please don't hesitate to contact me at [phone]. Thank you for your referral!       Physicians Signature: _____________________________   Date: ________________       By signing above, therapist's plan is approved by the physician.

## 2019-04-12 LAB — VITAMIN D 25-HYDROXY: 70 NG/ML (ref 30–100)

## 2019-04-29 NOTE — PROGRESS NOTES
SPEECH LANGUAGE PATHOLOGY  DAILY PROGRESS NOTE      Follow-up phone call placed to patient this date. She is reportedly completing her home exercise program consisting of oral motor, tongue base, and laryngeal strengthening exercises and reports improvement. She has been taking her pills with pudding and reports no further difficulty. Patient will be formally discharged from outpatient therapy at this time. She has SLP's phone number and will call with any future questions or concerns. SLP also encouraged patient to monitor her signs and symptoms of dysphagia and undergo a periodic Video Swallow Study every 6 months-1 year secondary to the progressive nature of Parkinson's Disease. She expressed understanding.         [address] [place], [place] [address]Jessie [address]  4/29/2019

## 2019-05-14 RX ORDER — LOSARTAN POTASSIUM AND HYDROCHLOROTHIAZIDE 12.5; 1 MG/1; MG/1
TABLET ORAL
Qty: 90 TABLET | Refills: 1 | Status: SHIPPED
Start: 2019-05-14 | End: 2020-01-01

## 2019-05-14 RX ORDER — SIMVASTATIN 40 MG
TABLET ORAL
Qty: 90 TABLET | Refills: 1 | Status: SHIPPED | OUTPATIENT
Start: 2019-05-14 | End: 2019-12-06 | Stop reason: SDUPTHER

## 2019-07-08 ENCOUNTER — TELEPHONE (OUTPATIENT)
Dept: FAMILY MEDICINE CLINIC | Age: 84
End: 2019-07-08

## 2019-07-12 ENCOUNTER — TELEPHONE (OUTPATIENT)
Dept: FAMILY MEDICINE CLINIC | Age: 84
End: 2019-07-12

## 2019-07-12 RX ORDER — BENZONATATE 200 MG/1
200 CAPSULE ORAL 3 TIMES DAILY PRN
Qty: 21 CAPSULE | Refills: 0 | Status: SHIPPED | OUTPATIENT
Start: 2019-07-12 | End: 2019-07-19

## 2019-07-16 ENCOUNTER — APPOINTMENT (OUTPATIENT)
Dept: GENERAL RADIOLOGY | Age: 84
DRG: 178 | End: 2019-07-16
Payer: MEDICARE

## 2019-07-16 ENCOUNTER — HOSPITAL ENCOUNTER (INPATIENT)
Age: 84
LOS: 2 days | Discharge: HOME HEALTH | DRG: 178 | End: 2019-07-18
Attending: EMERGENCY MEDICINE | Admitting: INTERNAL MEDICINE
Payer: MEDICARE

## 2019-07-16 DIAGNOSIS — D72.829 LEUKOCYTOSIS, UNSPECIFIED TYPE: ICD-10-CM

## 2019-07-16 DIAGNOSIS — E87.1 HYPONATREMIA: ICD-10-CM

## 2019-07-16 DIAGNOSIS — J18.9 PNEUMONIA OF RIGHT UPPER LOBE DUE TO INFECTIOUS ORGANISM: Primary | ICD-10-CM

## 2019-07-16 LAB
ALBUMIN SERPL-MCNC: 3.8 G/DL (ref 3.5–5.2)
ALP BLD-CCNC: 150 U/L (ref 35–104)
ALT SERPL-CCNC: 32 U/L (ref 0–32)
ANION GAP SERPL CALCULATED.3IONS-SCNC: 11 MMOL/L (ref 7–16)
AST SERPL-CCNC: 42 U/L (ref 0–31)
BASOPHILS ABSOLUTE: 0.06 E9/L (ref 0–0.2)
BASOPHILS RELATIVE PERCENT: 0.3 % (ref 0–2)
BILIRUB SERPL-MCNC: 0.4 MG/DL (ref 0–1.2)
BUN BLDV-MCNC: 9 MG/DL (ref 8–23)
CALCIUM SERPL-MCNC: 9.1 MG/DL (ref 8.6–10.2)
CHLORIDE BLD-SCNC: 92 MMOL/L (ref 98–107)
CO2: 28 MMOL/L (ref 22–29)
CREAT SERPL-MCNC: 0.6 MG/DL (ref 0.5–1)
EKG ATRIAL RATE: 95 BPM
EKG P AXIS: 71 DEGREES
EKG P-R INTERVAL: 126 MS
EKG Q-T INTERVAL: 352 MS
EKG QRS DURATION: 78 MS
EKG QTC CALCULATION (BAZETT): 442 MS
EKG R AXIS: 43 DEGREES
EKG T AXIS: 51 DEGREES
EKG VENTRICULAR RATE: 95 BPM
EOSINOPHILS ABSOLUTE: 0.08 E9/L (ref 0.05–0.5)
EOSINOPHILS RELATIVE PERCENT: 0.4 % (ref 0–6)
GFR AFRICAN AMERICAN: >60
GFR NON-AFRICAN AMERICAN: >60 ML/MIN/1.73
GLUCOSE BLD-MCNC: 114 MG/DL (ref 74–99)
HCT VFR BLD CALC: 35.6 % (ref 34–48)
HEMOGLOBIN: 11.3 G/DL (ref 11.5–15.5)
IMMATURE GRANULOCYTES #: 0.23 E9/L
IMMATURE GRANULOCYTES %: 1.3 % (ref 0–5)
LACTIC ACID: 1.7 MMOL/L (ref 0.5–2.2)
LYMPHOCYTES ABSOLUTE: 0.82 E9/L (ref 1.5–4)
LYMPHOCYTES RELATIVE PERCENT: 4.5 % (ref 20–42)
MCH RBC QN AUTO: 30 PG (ref 26–35)
MCHC RBC AUTO-ENTMCNC: 31.7 % (ref 32–34.5)
MCV RBC AUTO: 94.4 FL (ref 80–99.9)
MONOCYTES ABSOLUTE: 0.8 E9/L (ref 0.1–0.95)
MONOCYTES RELATIVE PERCENT: 4.4 % (ref 2–12)
NEUTROPHILS ABSOLUTE: 16.38 E9/L (ref 1.8–7.3)
NEUTROPHILS RELATIVE PERCENT: 89.1 % (ref 43–80)
PDW BLD-RTO: 12.6 FL (ref 11.5–15)
PLATELET # BLD: 328 E9/L (ref 130–450)
PMV BLD AUTO: 9.2 FL (ref 7–12)
POTASSIUM SERPL-SCNC: 5.3 MMOL/L (ref 3.5–5)
PRO-BNP: 365 PG/ML (ref 0–450)
RBC # BLD: 3.77 E12/L (ref 3.5–5.5)
SODIUM BLD-SCNC: 131 MMOL/L (ref 132–146)
TOTAL PROTEIN: 7.8 G/DL (ref 6.4–8.3)
TROPONIN: <0.01 NG/ML (ref 0–0.03)
WBC # BLD: 18.4 E9/L (ref 4.5–11.5)

## 2019-07-16 PROCEDURE — 87040 BLOOD CULTURE FOR BACTERIA: CPT

## 2019-07-16 PROCEDURE — 2580000003 HC RX 258: Performed by: STUDENT IN AN ORGANIZED HEALTH CARE EDUCATION/TRAINING PROGRAM

## 2019-07-16 PROCEDURE — 87081 CULTURE SCREEN ONLY: CPT

## 2019-07-16 PROCEDURE — 6370000000 HC RX 637 (ALT 250 FOR IP): Performed by: INTERNAL MEDICINE

## 2019-07-16 PROCEDURE — 6360000002 HC RX W HCPCS: Performed by: EMERGENCY MEDICINE

## 2019-07-16 PROCEDURE — 94760 N-INVAS EAR/PLS OXIMETRY 1: CPT

## 2019-07-16 PROCEDURE — 2580000003 HC RX 258: Performed by: INTERNAL MEDICINE

## 2019-07-16 PROCEDURE — 83605 ASSAY OF LACTIC ACID: CPT

## 2019-07-16 PROCEDURE — 71045 X-RAY EXAM CHEST 1 VIEW: CPT

## 2019-07-16 PROCEDURE — 93005 ELECTROCARDIOGRAM TRACING: CPT | Performed by: STUDENT IN AN ORGANIZED HEALTH CARE EDUCATION/TRAINING PROGRAM

## 2019-07-16 PROCEDURE — 2580000003 HC RX 258: Performed by: EMERGENCY MEDICINE

## 2019-07-16 PROCEDURE — 6370000000 HC RX 637 (ALT 250 FOR IP): Performed by: STUDENT IN AN ORGANIZED HEALTH CARE EDUCATION/TRAINING PROGRAM

## 2019-07-16 PROCEDURE — 2060000000 HC ICU INTERMEDIATE R&B

## 2019-07-16 PROCEDURE — 84484 ASSAY OF TROPONIN QUANT: CPT

## 2019-07-16 PROCEDURE — 99285 EMERGENCY DEPT VISIT HI MDM: CPT

## 2019-07-16 PROCEDURE — 85025 COMPLETE CBC W/AUTO DIFF WBC: CPT

## 2019-07-16 PROCEDURE — 96365 THER/PROPH/DIAG IV INF INIT: CPT

## 2019-07-16 PROCEDURE — 6360000002 HC RX W HCPCS: Performed by: INTERNAL MEDICINE

## 2019-07-16 PROCEDURE — 80053 COMPREHEN METABOLIC PANEL: CPT

## 2019-07-16 PROCEDURE — 93010 ELECTROCARDIOGRAM REPORT: CPT | Performed by: INTERNAL MEDICINE

## 2019-07-16 PROCEDURE — 99222 1ST HOSP IP/OBS MODERATE 55: CPT | Performed by: INTERNAL MEDICINE

## 2019-07-16 PROCEDURE — 83880 ASSAY OF NATRIURETIC PEPTIDE: CPT

## 2019-07-16 RX ORDER — HYDROCHLOROTHIAZIDE 25 MG/1
25 TABLET ORAL DAILY
Status: DISCONTINUED | OUTPATIENT
Start: 2019-07-16 | End: 2019-07-17

## 2019-07-16 RX ORDER — SODIUM CHLORIDE 9 MG/ML
INJECTION, SOLUTION INTRAVENOUS CONTINUOUS
Status: DISCONTINUED | OUTPATIENT
Start: 2019-07-16 | End: 2019-07-18

## 2019-07-16 RX ORDER — OMEGA-3/DHA/EPA/FISH OIL 300-1000MG
2 CAPSULE ORAL EVERY MORNING
Status: DISCONTINUED | OUTPATIENT
Start: 2019-07-17 | End: 2019-07-16 | Stop reason: CLARIF

## 2019-07-16 RX ORDER — ONDANSETRON 2 MG/ML
4 INJECTION INTRAMUSCULAR; INTRAVENOUS EVERY 6 HOURS PRN
Status: DISCONTINUED | OUTPATIENT
Start: 2019-07-16 | End: 2019-07-18 | Stop reason: HOSPADM

## 2019-07-16 RX ORDER — LOSARTAN POTASSIUM AND HYDROCHLOROTHIAZIDE 12.5; 1 MG/1; MG/1
1 TABLET ORAL EVERY MORNING
Status: DISCONTINUED | OUTPATIENT
Start: 2019-07-17 | End: 2019-07-16

## 2019-07-16 RX ORDER — SODIUM CHLORIDE 0.9 % (FLUSH) 0.9 %
10 SYRINGE (ML) INJECTION PRN
Status: DISCONTINUED | OUTPATIENT
Start: 2019-07-16 | End: 2019-07-18 | Stop reason: HOSPADM

## 2019-07-16 RX ORDER — SODIUM CHLORIDE 0.9 % (FLUSH) 0.9 %
10 SYRINGE (ML) INJECTION EVERY 12 HOURS SCHEDULED
Status: DISCONTINUED | OUTPATIENT
Start: 2019-07-16 | End: 2019-07-18 | Stop reason: HOSPADM

## 2019-07-16 RX ORDER — BENZONATATE 100 MG/1
200 CAPSULE ORAL 3 TIMES DAILY PRN
Status: DISCONTINUED | OUTPATIENT
Start: 2019-07-16 | End: 2019-07-18 | Stop reason: HOSPADM

## 2019-07-16 RX ORDER — OYSTER SHELL CALCIUM WITH VITAMIN D 500; 200 MG/1; [IU]/1
1 TABLET, FILM COATED ORAL EVERY MORNING
Status: DISCONTINUED | OUTPATIENT
Start: 2019-07-17 | End: 2019-07-18 | Stop reason: HOSPADM

## 2019-07-16 RX ORDER — SIMVASTATIN 40 MG
40 TABLET ORAL NIGHTLY
Status: DISCONTINUED | OUTPATIENT
Start: 2019-07-16 | End: 2019-07-18 | Stop reason: HOSPADM

## 2019-07-16 RX ORDER — ALBUTEROL SULFATE 90 UG/1
2 AEROSOL, METERED RESPIRATORY (INHALATION) EVERY 6 HOURS PRN
Status: DISCONTINUED | OUTPATIENT
Start: 2019-07-16 | End: 2019-07-17

## 2019-07-16 RX ORDER — M-VIT,TX,IRON,MINS/CALC/FOLIC 27MG-0.4MG
1 TABLET ORAL EVERY MORNING
COMMUNITY

## 2019-07-16 RX ORDER — PAROXETINE 10 MG/1
10 TABLET, FILM COATED ORAL EVERY MORNING
Status: DISCONTINUED | OUTPATIENT
Start: 2019-07-17 | End: 2019-07-18 | Stop reason: HOSPADM

## 2019-07-16 RX ORDER — ASPIRIN 81 MG/1
81 TABLET ORAL NIGHTLY
Status: DISCONTINUED | OUTPATIENT
Start: 2019-07-16 | End: 2019-07-18 | Stop reason: HOSPADM

## 2019-07-16 RX ORDER — ACETAMINOPHEN 325 MG/1
650 TABLET ORAL ONCE
Status: COMPLETED | OUTPATIENT
Start: 2019-07-16 | End: 2019-07-16

## 2019-07-16 RX ORDER — LOSARTAN POTASSIUM 50 MG/1
50 TABLET ORAL DAILY
Status: DISCONTINUED | OUTPATIENT
Start: 2019-07-16 | End: 2019-07-18 | Stop reason: HOSPADM

## 2019-07-16 RX ORDER — M-VIT,TX,IRON,MINS/CALC/FOLIC 27MG-0.4MG
1 TABLET ORAL EVERY MORNING
Status: DISCONTINUED | OUTPATIENT
Start: 2019-07-17 | End: 2019-07-18 | Stop reason: HOSPADM

## 2019-07-16 RX ORDER — 0.9 % SODIUM CHLORIDE 0.9 %
500 INTRAVENOUS SOLUTION INTRAVENOUS ONCE
Status: COMPLETED | OUTPATIENT
Start: 2019-07-16 | End: 2019-07-16

## 2019-07-16 RX ADMIN — SODIUM CHLORIDE 500 ML: 9 INJECTION, SOLUTION INTRAVENOUS at 15:22

## 2019-07-16 RX ADMIN — CARBIDOPA AND LEVODOPA 1 TABLET: 25; 100 TABLET ORAL at 20:42

## 2019-07-16 RX ADMIN — SODIUM CHLORIDE: 9 INJECTION, SOLUTION INTRAVENOUS at 20:31

## 2019-07-16 RX ADMIN — ENOXAPARIN SODIUM 40 MG: 40 INJECTION SUBCUTANEOUS at 20:42

## 2019-07-16 RX ADMIN — Medication 10 ML: at 20:33

## 2019-07-16 RX ADMIN — VANCOMYCIN HYDROCHLORIDE 1000 MG: 1 INJECTION, POWDER, LYOPHILIZED, FOR SOLUTION INTRAVENOUS at 17:32

## 2019-07-16 RX ADMIN — PIPERACILLIN SODIUM AND TAZOBACTAM SODIUM 3.38 G: 3; .375 INJECTION, POWDER, LYOPHILIZED, FOR SOLUTION INTRAVENOUS at 20:33

## 2019-07-16 RX ADMIN — CEFEPIME 2 G: 2 INJECTION, POWDER, FOR SOLUTION INTRAVENOUS at 16:21

## 2019-07-16 RX ADMIN — ACETAMINOPHEN 650 MG: 325 TABLET ORAL at 15:30

## 2019-07-16 RX ADMIN — SIMVASTATIN 40 MG: 40 TABLET, FILM COATED ORAL at 20:42

## 2019-07-16 RX ADMIN — ASPIRIN 81 MG: 81 TABLET ORAL at 20:42

## 2019-07-16 SDOH — HEALTH STABILITY: MENTAL HEALTH: HOW OFTEN DO YOU HAVE A DRINK CONTAINING ALCOHOL?: NEVER

## 2019-07-16 ASSESSMENT — ENCOUNTER SYMPTOMS
DIARRHEA: 0
RHINORRHEA: 0
VOMITING: 0
NAUSEA: 0
WHEEZING: 0
SHORTNESS OF BREATH: 0
CHEST TIGHTNESS: 0
BACK PAIN: 0
COUGH: 1
ABDOMINAL PAIN: 0
SORE THROAT: 0
BLOOD IN STOOL: 0
CONSTIPATION: 0

## 2019-07-16 ASSESSMENT — PAIN SCALES - GENERAL
PAINLEVEL_OUTOF10: 0

## 2019-07-16 NOTE — ED PROVIDER NOTES
Patient is 77-year-old female who was recently discharged after treatment for pneumonia who presents the emergency department with persistent cough, fever, and fatigue. Patient was discharged Thursday night for pneumonia treatment. She was placed on Augmentin. Patient was afebrile when she was discharged. Home health nurse saw her the next day and patient still elected well improved. However, patient started to feel like her cough was getting worse and had a fever measured today. Patient's fever was 100.6. She went to her primary care provider and he recommended that she come the emergency department for further evaluation. Patient states that she does have productive cough and describes the sputum is gray. On presentation emergency department patient's temperature is 100 °F.  She denies any chest pain, abdominal pain, nausea/vomiting, changes in urination or defecation. Review of Systems   Constitutional: Positive for fatigue and fever. Negative for appetite change and diaphoresis. HENT: Negative for congestion, rhinorrhea and sore throat. Eyes: Negative for visual disturbance. Respiratory: Positive for cough. Negative for chest tightness, shortness of breath and wheezing. Cardiovascular: Negative for chest pain, palpitations and leg swelling. Gastrointestinal: Negative for abdominal pain, blood in stool, constipation, diarrhea, nausea and vomiting. Endocrine: Negative for polyuria. Genitourinary: Negative for decreased urine volume, dysuria and vaginal discharge. Musculoskeletal: Negative for back pain, neck pain and neck stiffness. Skin: Negative for rash. Neurological: Negative for syncope, weakness, light-headedness and headaches. Hematological: Negative for adenopathy. Psychiatric/Behavioral: Negative for confusion. Physical Exam   Constitutional: She is oriented to person, place, and time. She appears well-developed and well-nourished. No distress.    HENT: gastrointestinal endoscopy (2010). Social History:  reports that she has never smoked. She has never used smokeless tobacco. She reports that she does not drink alcohol or use drugs. Family History: family history includes Cancer (age of onset: 76) in her father; Heart Disease in her father; Heart Disease (age of onset: [de-identified]) in her mother. The patients home medications have been reviewed. Allergies: Codeine    -------------------------------------------------- RESULTS -------------------------------------------------    LABS:  Results for orders placed or performed during the hospital encounter of 07/16/19   Culture Blood #1   Result Value Ref Range    Blood Culture, Routine 24 Hours- no growth    Culture Blood #2   Result Value Ref Range    Culture, Blood 2 24 Hours- no growth    LEGIONELLA ANTIGEN, URINE   Result Value Ref Range    L. pneumophila Serogp 1 Ur Ag       Presumptive NEGATIVE suggesting no recent or current infections  with Legionella pneumophilia serogroup 1. Infection to  Legionella cannot be ruled out since other serogroups and  species may cause infection, antigen may not be present in  early infection, or level of antigen may be below the  detection limit. STREP PNEUMONIAE ANTIGEN   Result Value Ref Range    STREP PNEUMONIAE ANTIGEN, URINE       Presumptive NEGATIVE for Pneumococcal pneumonia, suggesting  no current or recent pneumococcal infection.  Infection due  to S. pneumoniae cannot be ruled out since the antigen present  in the sample may be below the detection limit of the test.     MRSA SCREENING CULTURE ONLY   Result Value Ref Range    MRSA Culture Only Methicillin resistant Staph aureus not isolated    Respiratory Panel, Film Array   Result Value Ref Range    Film Array Adenovirus       Result: Not Detected  *  *  Normal Range: Not Detected      Film Array Coronavirus HKU1       Result: Not Detected  *  *  Normal Range: Not Detected      Film Array Conoravirus NL63 0.0 - 6.0 %    Basophils % 0.3 0.0 - 2.0 %    Neutrophils # 13.93 (H) 1.80 - 7.30 E9/L    Immature Granulocytes # 0.16 E9/L    Lymphocytes # 0.96 (L) 1.50 - 4.00 E9/L    Monocytes # 0.67 0.10 - 0.95 E9/L    Eosinophils # 0.08 0.05 - 0.50 E9/L    Basophils # 0.05 0.00 - 0.20 L8/Y   Basic Metabolic Panel   Result Value Ref Range    Sodium 138 132 - 146 mmol/L    Potassium 3.5 3.5 - 5.0 mmol/L    Chloride 100 98 - 107 mmol/L    CO2 26 22 - 29 mmol/L    Anion Gap 12 7 - 16 mmol/L    Glucose 87 74 - 99 mg/dL    BUN 6 (L) 8 - 23 mg/dL    CREATININE 0.6 0.5 - 1.0 mg/dL    GFR Non-African American >60 >=60 mL/min/1.73    GFR African American >60     Calcium 9.1 8.6 - 10.2 mg/dL   Procalcitonin   Result Value Ref Range    Procalcitonin 0.06 0.00 - 0.08 ng/mL   EKG 12 Lead   Result Value Ref Range    Ventricular Rate 95 BPM    Atrial Rate 95 BPM    P-R Interval 126 ms    QRS Duration 78 ms    Q-T Interval 352 ms    QTc Calculation (Bazett) 442 ms    P Axis 71 degrees    R Axis 43 degrees    T Axis 51 degrees       RADIOLOGY:  CT Chest WO Contrast   Final Result      1. Scattered areas of mixed interstitial and airspace opacity   bilaterally notable at lung bases are likely related to   bronchopneumonia. Clinical correlation recommended. Short-term   follow-up recommended to assure resolution. 2. Small bilateral pleural effusions. ALERT:  THIS IS AN ABNORMAL REPORT            XR CHEST PORTABLE   Final Result   ALERT:  THIS IS AN ABNORMAL REPORT   1. Possible mass/malignancy in the inferior lateral right upper lobe. Further evaluation with CT scan of the chest is recommended.    2. Bibasilar airspace disease, concerning for   infiltrate/pneumonia/atelectasis/scarring.              ------------------------- NURSING NOTES AND VITALS REVIEWED ---------------------------  Date / Time Roomed:  7/16/2019  2:36 PM  ED Bed Assignment:  [id number]/[id number]    The nursing notes within the ED encounter and vital signs as below have been reviewed. Patient Vitals for the past 24 hrs:   BP Temp Temp src Pulse Resp SpO2 Weight   07/18/19 0910 (!) 149/59 98.2 °F (36.8 °C) Oral 87 16 98 % --   07/18/19 0332 -- -- -- -- -- -- 110 lb 1.6 oz (49.9 kg)   07/18/19 0209 (!) 120/57 98.5 °F (36.9 °C) Oral 90 16 94 % --   07/17/19 1940 (!) 116/56 99.1 °F (37.3 °C) Oral 85 16 96 % --       Oxygen Saturation Interpretation: Normal    ------------------------------------------ PROGRESS NOTES ------------------------------------------  Counseling:  I have spoken with the patient and discussed todays results, in addition to providing specific details for the plan of care and counseling regarding the diagnosis and prognosis. Their questions are answered at this time and they are agreeable with the plan of admission.    --------------------------------- ADDITIONAL PROVIDER NOTES ---------------------------------  Consultation  Spoke with Dr. Stuart Dailey. Discussed case. They will admit the patient. This patient's ED course included: a personal history and physicial examination, re-evaluation prior to disposition, multiple bedside re-evaluations, IV medications, cardiac monitoring, continuous pulse oximetry and complex medical decision making and emergency management     Imaging and laboratory results with patient. Patient was diagnosed for continued right upper lobe pneumonia. Antibiotics were started in the emergency department. Patient's sodium level was also found to be low on laboratory results. She did receive fluids in the emergency department. Discussed case with admitting physician and he agreed for further management. Patient agreed with this plan was admitted. This patient has remained hemodynamically stable during their ED course. Diagnosis:  1. Pneumonia of right upper lobe due to infectious organism ([address]    2. Hyponatremia    3.  Leukocytosis, unspecified type        Disposition:  Patient's disposition: Admit to telemetry  Patient's

## 2019-07-16 NOTE — ED NOTES
Bed: 25  Expected date:   Expected time:   Means of arrival:   Comments:  115 Nancie Giron RN  07/16/19 4925

## 2019-07-16 NOTE — PROGRESS NOTES
Database complete. Medications reconciled. Care plans and education initiated. Neurologist is Dr. Bela Bishop. Uses [facility].

## 2019-07-17 ENCOUNTER — APPOINTMENT (OUTPATIENT)
Dept: CT IMAGING | Age: 84
DRG: 178 | End: 2019-07-17
Payer: MEDICARE

## 2019-07-17 LAB
ANION GAP SERPL CALCULATED.3IONS-SCNC: 12 MMOL/L (ref 7–16)
BASOPHILS ABSOLUTE: 0.05 E9/L (ref 0–0.2)
BASOPHILS RELATIVE PERCENT: 0.3 % (ref 0–2)
BUN BLDV-MCNC: 6 MG/DL (ref 8–23)
CALCIUM SERPL-MCNC: 9.1 MG/DL (ref 8.6–10.2)
CHLORIDE BLD-SCNC: 100 MMOL/L (ref 98–107)
CO2: 26 MMOL/L (ref 22–29)
CREAT SERPL-MCNC: 0.6 MG/DL (ref 0.5–1)
EOSINOPHILS ABSOLUTE: 0.08 E9/L (ref 0.05–0.5)
EOSINOPHILS RELATIVE PERCENT: 0.5 % (ref 0–6)
FILM ARRAY ADENOVIRUS: NORMAL
FILM ARRAY BORDETELLA PERTUSSIS: NORMAL
FILM ARRAY CHLAMYDOPHILIA PNEUMONIAE: NORMAL
FILM ARRAY CORONAVIRUS 229E: NORMAL
FILM ARRAY CORONAVIRUS HKU1: NORMAL
FILM ARRAY CORONAVIRUS NL63: NORMAL
FILM ARRAY CORONAVIRUS OC43: NORMAL
FILM ARRAY INFLUENZA A VIRUS 09H1: NORMAL
FILM ARRAY INFLUENZA A VIRUS H1: NORMAL
FILM ARRAY INFLUENZA A VIRUS H3: NORMAL
FILM ARRAY INFLUENZA A VIRUS: NORMAL
FILM ARRAY INFLUENZA B: NORMAL
FILM ARRAY METAPNEUMOVIRUS: NORMAL
FILM ARRAY MYCOPLASMA PNEUMONIAE: NORMAL
FILM ARRAY PARAINFLUENZA VIRUS 1: NORMAL
FILM ARRAY PARAINFLUENZA VIRUS 2: NORMAL
FILM ARRAY PARAINFLUENZA VIRUS 3: NORMAL
FILM ARRAY PARAINFLUENZA VIRUS 4: NORMAL
FILM ARRAY RESPIRATORY SYNCITIAL VIRUS: NORMAL
FILM ARRAY RHINOVIRUS/ENTEROVIRUS: NORMAL
GFR AFRICAN AMERICAN: >60
GFR NON-AFRICAN AMERICAN: >60 ML/MIN/1.73
GLUCOSE BLD-MCNC: 87 MG/DL (ref 74–99)
HCT VFR BLD CALC: 39.1 % (ref 34–48)
HEMOGLOBIN: 12.3 G/DL (ref 11.5–15.5)
IMMATURE GRANULOCYTES #: 0.16 E9/L
IMMATURE GRANULOCYTES %: 1 % (ref 0–5)
L. PNEUMOPHILA SEROGP 1 UR AG: NORMAL
LYMPHOCYTES ABSOLUTE: 0.96 E9/L (ref 1.5–4)
LYMPHOCYTES RELATIVE PERCENT: 6.1 % (ref 20–42)
MCH RBC QN AUTO: 30 PG (ref 26–35)
MCHC RBC AUTO-ENTMCNC: 31.5 % (ref 32–34.5)
MCV RBC AUTO: 95.4 FL (ref 80–99.9)
MONOCYTES ABSOLUTE: 0.67 E9/L (ref 0.1–0.95)
MONOCYTES RELATIVE PERCENT: 4.2 % (ref 2–12)
NEUTROPHILS ABSOLUTE: 13.93 E9/L (ref 1.8–7.3)
NEUTROPHILS RELATIVE PERCENT: 87.9 % (ref 43–80)
PDW BLD-RTO: 12.4 FL (ref 11.5–15)
PLATELET # BLD: 336 E9/L (ref 130–450)
PMV BLD AUTO: 9.1 FL (ref 7–12)
POTASSIUM SERPL-SCNC: 3.5 MMOL/L (ref 3.5–5)
PROCALCITONIN: 0.06 NG/ML (ref 0–0.08)
RBC # BLD: 4.1 E12/L (ref 3.5–5.5)
SODIUM BLD-SCNC: 138 MMOL/L (ref 132–146)
STREP PNEUMONIAE ANTIGEN, URINE: NORMAL
WBC # BLD: 15.9 E9/L (ref 4.5–11.5)

## 2019-07-17 PROCEDURE — 94664 DEMO&/EVAL PT USE INHALER: CPT

## 2019-07-17 PROCEDURE — 84145 PROCALCITONIN (PCT): CPT

## 2019-07-17 PROCEDURE — 94640 AIRWAY INHALATION TREATMENT: CPT

## 2019-07-17 PROCEDURE — 6370000000 HC RX 637 (ALT 250 FOR IP): Performed by: INTERNAL MEDICINE

## 2019-07-17 PROCEDURE — 87486 CHLMYD PNEUM DNA AMP PROBE: CPT

## 2019-07-17 PROCEDURE — 85025 COMPLETE CBC W/AUTO DIFF WBC: CPT

## 2019-07-17 PROCEDURE — 80048 BASIC METABOLIC PNL TOTAL CA: CPT

## 2019-07-17 PROCEDURE — 87633 RESP VIRUS 12-25 TARGETS: CPT

## 2019-07-17 PROCEDURE — 2580000003 HC RX 258: Performed by: INTERNAL MEDICINE

## 2019-07-17 PROCEDURE — 97535 SELF CARE MNGMENT TRAINING: CPT

## 2019-07-17 PROCEDURE — 36415 COLL VENOUS BLD VENIPUNCTURE: CPT

## 2019-07-17 PROCEDURE — 6370000000 HC RX 637 (ALT 250 FOR IP): Performed by: NURSE PRACTITIONER

## 2019-07-17 PROCEDURE — 99233 SBSQ HOSP IP/OBS HIGH 50: CPT | Performed by: INTERNAL MEDICINE

## 2019-07-17 PROCEDURE — 87450 HC DIRECT STREP B ANTIGEN: CPT

## 2019-07-17 PROCEDURE — 97161 PT EVAL LOW COMPLEX 20 MIN: CPT

## 2019-07-17 PROCEDURE — 71250 CT THORAX DX C-: CPT

## 2019-07-17 PROCEDURE — 97165 OT EVAL LOW COMPLEX 30 MIN: CPT

## 2019-07-17 PROCEDURE — 87798 DETECT AGENT NOS DNA AMP: CPT

## 2019-07-17 PROCEDURE — 2060000000 HC ICU INTERMEDIATE R&B

## 2019-07-17 PROCEDURE — 6360000002 HC RX W HCPCS: Performed by: INTERNAL MEDICINE

## 2019-07-17 PROCEDURE — 87581 M.PNEUMON DNA AMP PROBE: CPT

## 2019-07-17 RX ORDER — IBUPROFEN 200 MG
TABLET ORAL 2 TIMES DAILY
Status: DISCONTINUED | OUTPATIENT
Start: 2019-07-17 | End: 2019-07-18 | Stop reason: HOSPADM

## 2019-07-17 RX ORDER — IPRATROPIUM BROMIDE AND ALBUTEROL SULFATE 2.5; .5 MG/3ML; MG/3ML
1 SOLUTION RESPIRATORY (INHALATION) EVERY 4 HOURS
Status: DISCONTINUED | OUTPATIENT
Start: 2019-07-17 | End: 2019-07-18 | Stop reason: HOSPADM

## 2019-07-17 RX ADMIN — ENOXAPARIN SODIUM 40 MG: 40 INJECTION SUBCUTANEOUS at 10:55

## 2019-07-17 RX ADMIN — PIPERACILLIN SODIUM AND TAZOBACTAM SODIUM 3.38 G: 3; .375 INJECTION, POWDER, LYOPHILIZED, FOR SOLUTION INTRAVENOUS at 22:46

## 2019-07-17 RX ADMIN — CARBIDOPA AND LEVODOPA 1 TABLET: 25; 100 TABLET ORAL at 20:45

## 2019-07-17 RX ADMIN — Medication 10 ML: at 22:49

## 2019-07-17 RX ADMIN — Medication 10 ML: at 20:45

## 2019-07-17 RX ADMIN — Medication 1 TABLET: at 10:56

## 2019-07-17 RX ADMIN — SODIUM CHLORIDE: 9 INJECTION, SOLUTION INTRAVENOUS at 20:49

## 2019-07-17 RX ADMIN — SODIUM CHLORIDE: 9 INJECTION, SOLUTION INTRAVENOUS at 10:17

## 2019-07-17 RX ADMIN — VANCOMYCIN HYDROCHLORIDE 1000 MG: 1 INJECTION, POWDER, LYOPHILIZED, FOR SOLUTION INTRAVENOUS at 04:57

## 2019-07-17 RX ADMIN — IPRATROPIUM BROMIDE AND ALBUTEROL SULFATE 1 AMPULE: .5; 3 SOLUTION RESPIRATORY (INHALATION) at 19:15

## 2019-07-17 RX ADMIN — LOSARTAN POTASSIUM 50 MG: 50 TABLET, FILM COATED ORAL at 10:56

## 2019-07-17 RX ADMIN — POLYMYXIN B SULFATE, BACITRACIN ZINC, NEOMYCIN SULFATE: 5000; 3.5; 4 OINTMENT TOPICAL at 22:45

## 2019-07-17 RX ADMIN — BENZONATATE 200 MG: 100 CAPSULE ORAL at 20:45

## 2019-07-17 RX ADMIN — PAROXETINE 10 MG: 10 TABLET, FILM COATED ORAL at 10:56

## 2019-07-17 RX ADMIN — OYSTER SHELL CALCIUM WITH VITAMIN D 1 TABLET: 500; 200 TABLET, FILM COATED ORAL at 10:56

## 2019-07-17 RX ADMIN — BENZONATATE 200 MG: 100 CAPSULE ORAL at 05:01

## 2019-07-17 RX ADMIN — CARBIDOPA AND LEVODOPA 1 TABLET: 25; 100 TABLET ORAL at 10:56

## 2019-07-17 RX ADMIN — PIPERACILLIN SODIUM AND TAZOBACTAM SODIUM 3.38 G: 3; .375 INJECTION, POWDER, LYOPHILIZED, FOR SOLUTION INTRAVENOUS at 16:21

## 2019-07-17 RX ADMIN — PIPERACILLIN SODIUM AND TAZOBACTAM SODIUM 3.38 G: 3; .375 INJECTION, POWDER, LYOPHILIZED, FOR SOLUTION INTRAVENOUS at 06:26

## 2019-07-17 RX ADMIN — IPRATROPIUM BROMIDE AND ALBUTEROL SULFATE 1 AMPULE: .5; 3 SOLUTION RESPIRATORY (INHALATION) at 16:12

## 2019-07-17 RX ADMIN — CARBIDOPA AND LEVODOPA 1 TABLET: 25; 100 TABLET ORAL at 15:11

## 2019-07-17 RX ADMIN — ASPIRIN 81 MG: 81 TABLET ORAL at 20:45

## 2019-07-17 RX ADMIN — SIMVASTATIN 40 MG: 40 TABLET, FILM COATED ORAL at 20:45

## 2019-07-17 ASSESSMENT — PAIN SCALES - GENERAL
PAINLEVEL_OUTOF10: 0
PAINLEVEL_OUTOF10: 0

## 2019-07-17 NOTE — CONSULTS
Pulmonary Consultation    Admit Date: 7/16/2019  Requesting Physician: Terrie Marcum MD    CC:  · Pneumonia  HPI:  · Meg Long is an 55-year-old white female who presented to this institution with a fever. · According to the patient, she was admitted to [facility] last Monday. She was discharged on Thursday. At that time, she gave a history of choking on some Metamucil. Treated with Unasyn and then discharged on Augmentin, the patient presents now with a worsening fever. Seen in the emergency room, chest radiograph evidenced a right upper lobe infiltrate. The patient admits to a cough but that cough is nonproductive. There is been no hemoptysis and there is been no sudden onset of shortness of breath. The patient does admit to having lower extremity edema but it seems to be symmetric and painless. She notes that that is a chronic problem. · From a pulmonary standpoint, the patient is a lifelong non-smoker. She has no occupational history. There has been no chest pain. Her  is at the bedside and corroborates the history. There is been no recent travel history. PMH:    Past Medical History:   Diagnosis Date    Asthma     Hyperlipidemia     Hypertension     Parkinson disease ([facility]      PSH:   Past Surgical History:   Procedure Laterality Date    COLONOSCOPY  10/2010    HYSTERECTOMY      UPPER GASTROINTESTINAL ENDOSCOPY  2010       Review of Systems:   · Constitutional: As noted in the HPI. Denies fever or chills. · Eyes: No visual changes or diplopia. No scleral icterus. · ENT: No headaches, hearing loss or vertigo. No nasal congestion, or sore throat. · Cardiovascular: No chest pain, dyspnea on exertion, or palpitations. · Respiratory: See above  · Gastrointestinal: No abdominal pain, nausea or emesis. No diarrhea or rectal bleeding or melena. No change in bowel habits. · Genitourinary: No dysuria, urinary frequency, or incontinence.  No PULSE OXIMETRY RANGE:  SpO2  Av.7 %  Min: 92 %  Max: 98 %    EXAM:  General: No distress. Alert. Eyes: PERRL. No sclera icterus. No conjunctival injection. ENT: No discharge. Pharynx clear. Neck: Trachea midline. Normal thyroid. No jvd, no hjr. Resp: No wheezing. No accessory muscle use. No rales. No rhonchi. CV: Regular rate. Regular rhythm. No murmur No rub. Abd: Non-tender. Non-distended. No masses. No organmegaly. Normal bowel sounds. Skin: Warm and dry. No nodule on exposed extremities. No rash on exposed extremities. Lymph: No cervical LAD. No supraclavicular LAD. Ext: No joint deformity. No clubbing. No cyanosis. No edema  Neuro: Awake. Follows commands. Positive pupils/gag/corneals. Normal pain response. Lab Results:  CBC:   Recent Labs     19  1508 19  1022   WBC 18.4* 15.9*   HGB 11.3* 12.3   HCT 35.6 39.1   MCV 94.4 95.4    336       BMP:  Recent Labs     19  1508 19  1022   * 138   K 5.3* 3.5   CL 92* 100   CO2 28 26   BUN 9 6*   CREATININE 0.6 0.6    ALB:3,BILIDIR:3,BILITOT:3,ALKPHOS:3)@    PT/INR: No results for input(s): PROTIME, INR in the last 72 hours. Cultures:  Sputum: not available  Blood: not available    ABG:   No results for input(s): PH, PO2, PCO2, HCO3, BE, O2SAT, METHB, O2HB, COHB, O2CON, HHB, THB in the last 72 hours. Films:     XR CHEST PORTABLE   Final Result   ALERT:  THIS IS AN ABNORMAL REPORT   1. Possible mass/malignancy in the inferior lateral right upper lobe. Further evaluation with CT scan of the chest is recommended. 2. Bibasilar airspace disease, concerning for   infiltrate/pneumonia/atelectasis/scarring. CT Chest WO Contrast    (Results Pending)   . Assessment:  1. Pneumonia, right upper lobe, community-acquired. Differential diagnosis includes thromboembolic disease but the patient's history is not suggestive of this. Her Wells score is 0.   The pneumonia would have been correctly

## 2019-07-17 NOTE — CARE COORDINATION
7/17/2019  Social Work Discharge Planning: This worker met with Pt to discuss  role and transition of care/discharge planning. Pt lives with her spouse in a 2 story home;howover, they stay on the first floor. Pt uses a cane. Pt is currently on IV ATB. Pt is active with [address] [place] [address]. [phone]  [phone]. HALLIE order will be needed if appropriate at discharge. Pharmacy is [address] in [place].  Electronically signed by CLAUDIA Ying on 7/17/2019 at 9:31 AM

## 2019-07-17 NOTE — CARE COORDINATION
Met with patient and her  at bedside, introduced myself and explained my role as RN case manager. Pt stated that she was recently discharged from [facility] for pneumonia and was feeling better until she began to have fevers again, which lead her to the ER. Discussed plan of care (medications/antibiotics, consults, testing, discharge plans, etc.). Pt verbalized understanding. Pt stated that she is active with [facility], and plans to discharge home with their services. Pt denies any questions or concerns at present time. Informed patient that case management and social work will continue to follow to assist with the transition of care.

## 2019-07-17 NOTE — PROGRESS NOTES
Cueing required for hand placement during transfers. At end of eval, pt left sitting up in chair with call light in reach and nursing present. Examination of body systems Decreased   Functional mobility x   ROM    Strength x   Safety Awareness    Cognition    Endurance x   Sensation    Balance x   Vision/Visual Deficets    Coordination      Patient education  Pt educated on PT objectives during eval and while in the hospital, hand placement during transfers. Patient response to education:   Pt verbalized understanding Pt demonstrated skill Pt requires further education in this area   Yes    yes     Rehab potential is Good for reaching above PT goals. Pts/ family goals   1. None stated    Patient and or family understand(s) diagnosis, prognosis, and plan of care. PLAN  PT care will be provided in accordance with the objectives noted above. Whenever appropriate, clear delegation orders will be provided for nursing staff. Exercises and functional mobility practice will be used as well as appropriate assistive devices or modalities to obtain goals. Patient and family education will also be administered as needed. Frequency of treatments will be 2-5x/week x 7-10 days.     Time in: 1000  Time out: [facility]  License number:  [id number]

## 2019-07-17 NOTE — PROGRESS NOTES
Pulmonary/Chest: clear to auscultation bilaterally  Cardiovascular: normal rate, normal S1 and S2 and no carotid bruits  Abdomen: soft, non-tender, non-distended, normal bowel sounds, no masses or organomegaly  Extremities: no cyanosis, no clubbing and no edema  Neurologic: speech normal       Recent Labs     07/16/19  1508 07/17/19  1022   * 138   K 5.3* 3.5   CL 92* 100   CO2 28 26   BUN 9 6*   CREATININE 0.6 0.6   GLUCOSE 114* 87   CALCIUM 9.1 9.1       Recent Labs     07/16/19  1508 07/17/19  1022   WBC 18.4* 15.9*   RBC 3.77 4.10   HGB 11.3* 12.3   HCT 35.6 39.1   MCV 94.4 95.4   MCH 30.0 30.0   MCHC 31.7* 31.5*   RDW 12.6 12.4    336   MPV 9.2 9.1       Assessment:    Principal Problem:    Pneumonia  Active Problems:    HTN (hypertension)    Parkinson's disease ([facility] [address]  Resolved Problems:    * No resolved hospital problems. *      Plan:  1. Pneumonia with possible aspiration component: H/o Parkinson's disease. patient reporting last week she had a choking episode where she vomited- she was admitted to [facility] for 4 days and discharged on augmentin. Reporting ongoing cough, sob, weakness. CXR imaging reviewed which show RUL infiltrate vs mass. [facility] records reviewed which showed infiltrate on left side as well- therefore pneumonia does appear to be improving. Will check sputum cultures/ respiratory panel/ pro-scar. Continue broad spectrum IV antibiotics- IV vancomycin/ IV zosyn. [place] modified barium swallow reviewed- speech recommending thin liquids/ general diet- due to retention of food- ongoing speech therapy recommended per [facility]. Will hold off on speech therapy here for now. Monitor vitals/ labs/ swallowing. Consult pulmonology for input. 2. Leukocytosis: associated to above: monitor. Cultures pending  3. Dysphagia: patient had modified barium swallow at [facility]- recommending thin liquids. However. Speech reporting ongoing speech therapy warranted. 4. Parkinson's disease: continue sinemet  5.

## 2019-07-17 NOTE — PROGRESS NOTES
Pharmacy Consultation Note  (Antibiotic Dosing and Monitoring)      Pharmacy is following for Vancomycin dosing. Allergies:  Codeine    80 y.o. female    Ht Readings from Last 1 Encounters:   07/16/19 5' 2\" (1.575 m)     Wt Readings from Last 1 Encounters:   07/16/19 110 lb (49.9 kg)       Recent Labs     07/16/19  1508   WBC 18.4*       Recent Labs     07/16/19  1508   BUN 9   CREATININE 0.6       Estimated Creatinine Clearance: 55 mL/min (based on SCr of 0.6 mg/dL).     No intake or output data in the 24 hours ending 07/16/19 2024    Cultures:  available culture and sensitivity results were reviewed in [facility]      Assessment:  · Consulted by Dr. Elvira Schafer to dose/monitor vancomycin  · Estimated CrCl = 55 mL/min  · Goal trough level = 15-20 mcg/mL    Plan:  · Will initiate vancomycin at a dose of 1G Q12H (already rec'd 1G  7/16 @ 1730)  · Monitor renal function   · Clinical pharmacy will follow up and complete full consult note      MOON Evans KIRT [facility] 7/16/2019 8:24 PM

## 2019-07-17 NOTE — PROGRESS NOTES
Occupational Therapy  OCCUPATIONAL THERAPY INITIAL EVALUATION      Date:2019  Patient Name: Anjana Grande  MRN: [id number]  : 1935  Room: [address] [place], [place] [address]    Evaluating OT: Nancy Wayne OTR/L - [id number]    AM-PAC Daily Activity Raw Score:       Recommended Adaptive Equipment: Continue to assess. Diagnosis: Pneumonia [J18.9]    Pertinent Medical History: Parkinson's disease, HTN, asthma      Precautions: falls; chair alarm    Home Living: Patient lives with her  in a two-floor home; patient's bedroom and bathroom are on the main living level. Bathroom Setup: walk-in shower (no seat, no grab bars)  Equipment Owned: cane    Prior Level of Function (PLOF): Per patient, she was independent with ADLs, needed assistance with most IADLs, and independent with functional mobility (with use of cane recently) prior to this hospitalization. Pain Level: Patient denied experiencing pain. Cognition: Patient alert and oriented x3. WFL command follow demonstrated. Memory: WFL   Sequencing: WFL   Problem Solving: Fair   Judgement/Safety: Fair    Functional Assessment:   Initial Eval Status  Date: 2019 Treatment Status  Date:  Short Term Goals  Treatment Frequency: PRN   Feeding Independent     Grooming SBA   (standing at sink) for hand washing and hair combing. Cues given to maximize safety with management of walker during ADLs.   Mod I / Independent  (standing at sink)   UB Dressing SBA  Independent   LB Dressing SBA  Independent / Mod I - with use of AE, as needed/appropriate   Bathing SBA  Independent / Mod I - with use of AE/DME, as needed/appropriate   Toileting SBA - CGA with toilet transfer; cues given to maximize safety with toilet transfer  Independent   Bed Mobility  Supine-to-Sit: Supervision   Independent   Functional Transfers Sit-to-Stand: Supervision   from EOB  Independent   Functional Mobility SBA - Supervision   (with walker) within patient's room, bathroom, and hallway  Mod I /

## 2019-07-18 VITALS
TEMPERATURE: 98.2 F | OXYGEN SATURATION: 98 % | SYSTOLIC BLOOD PRESSURE: 149 MMHG | HEART RATE: 87 BPM | WEIGHT: 110.1 LBS | BODY MASS INDEX: 20.26 KG/M2 | HEIGHT: 62 IN | RESPIRATION RATE: 16 BRPM | DIASTOLIC BLOOD PRESSURE: 59 MMHG

## 2019-07-18 LAB — MRSA CULTURE ONLY: NORMAL

## 2019-07-18 PROCEDURE — 97535 SELF CARE MNGMENT TRAINING: CPT

## 2019-07-18 PROCEDURE — 6360000002 HC RX W HCPCS: Performed by: INTERNAL MEDICINE

## 2019-07-18 PROCEDURE — 94640 AIRWAY INHALATION TREATMENT: CPT

## 2019-07-18 PROCEDURE — 99239 HOSP IP/OBS DSCHRG MGMT >30: CPT | Performed by: INTERNAL MEDICINE

## 2019-07-18 PROCEDURE — APPSS45 APP SPLIT SHARED TIME 31-45 MINUTES: Performed by: NURSE PRACTITIONER

## 2019-07-18 PROCEDURE — 6370000000 HC RX 637 (ALT 250 FOR IP): Performed by: INTERNAL MEDICINE

## 2019-07-18 PROCEDURE — 2580000003 HC RX 258: Performed by: INTERNAL MEDICINE

## 2019-07-18 RX ORDER — AMOXICILLIN AND CLAVULANATE POTASSIUM 875; 125 MG/1; MG/1
1 TABLET, FILM COATED ORAL 2 TIMES DAILY
Qty: 14 TABLET | Refills: 0 | Status: SHIPPED | OUTPATIENT
Start: 2019-07-18 | End: 2019-07-24

## 2019-07-18 RX ORDER — LEVOFLOXACIN 500 MG/1
500 TABLET, FILM COATED ORAL DAILY
Qty: 7 TABLET | Refills: 0 | Status: SHIPPED | OUTPATIENT
Start: 2019-07-18 | End: 2019-07-25

## 2019-07-18 RX ADMIN — PIPERACILLIN SODIUM AND TAZOBACTAM SODIUM 3.38 G: 3; .375 INJECTION, POWDER, LYOPHILIZED, FOR SOLUTION INTRAVENOUS at 06:30

## 2019-07-18 RX ADMIN — VANCOMYCIN HYDROCHLORIDE 1000 MG: 1 INJECTION, POWDER, LYOPHILIZED, FOR SOLUTION INTRAVENOUS at 05:10

## 2019-07-18 RX ADMIN — CARBIDOPA AND LEVODOPA 1 TABLET: 25; 100 TABLET ORAL at 09:16

## 2019-07-18 RX ADMIN — IPRATROPIUM BROMIDE AND ALBUTEROL SULFATE 1 AMPULE: .5; 3 SOLUTION RESPIRATORY (INHALATION) at 08:59

## 2019-07-18 RX ADMIN — PAROXETINE 10 MG: 10 TABLET, FILM COATED ORAL at 09:16

## 2019-07-18 RX ADMIN — BENZONATATE 200 MG: 100 CAPSULE ORAL at 09:23

## 2019-07-18 RX ADMIN — LOSARTAN POTASSIUM 50 MG: 50 TABLET, FILM COATED ORAL at 09:16

## 2019-07-18 RX ADMIN — ENOXAPARIN SODIUM 40 MG: 40 INJECTION SUBCUTANEOUS at 09:16

## 2019-07-18 RX ADMIN — IPRATROPIUM BROMIDE AND ALBUTEROL SULFATE 1 AMPULE: .5; 3 SOLUTION RESPIRATORY (INHALATION) at 11:22

## 2019-07-18 RX ADMIN — Medication 10 ML: at 09:17

## 2019-07-18 RX ADMIN — OYSTER SHELL CALCIUM WITH VITAMIN D 1 TABLET: 500; 200 TABLET, FILM COATED ORAL at 09:16

## 2019-07-18 RX ADMIN — POLYMYXIN B SULFATE, BACITRACIN ZINC, NEOMYCIN SULFATE: 5000; 3.5; 4 OINTMENT TOPICAL at 09:16

## 2019-07-18 RX ADMIN — CARBIDOPA AND LEVODOPA 1 TABLET: 25; 100 TABLET ORAL at 13:54

## 2019-07-18 RX ADMIN — IPRATROPIUM BROMIDE AND ALBUTEROL SULFATE 1 AMPULE: .5; 3 SOLUTION RESPIRATORY (INHALATION) at 15:51

## 2019-07-18 RX ADMIN — Medication 1 TABLET: at 09:16

## 2019-07-18 ASSESSMENT — PAIN SCALES - GENERAL: PAINLEVEL_OUTOF10: 0

## 2019-07-18 NOTE — DISCHARGE SUMMARY
nausea vomiting or diarrhea, no pain in her chest, no history of tobacco abuse, reports she has asthma. \"     Hospital Course:   Patient Jose Reed is a 80 y.o. presented with Pneumonia [J18.9]  Pneumonia [J18.9]   Patient was admitted to telemetry and pulmonology was consulted. Patient was followed and treated for community acquired multilobar pneumonia with possible aspiration component vs atypical/ cryptogenic organizing pneumonia. Respiratory panel was negative. Strep/ legionella was negative. She was started on IV zosyn and IV vancomycin which was transitioned to po augmentin and po levaquin at discharge. She had a CT of the chest and will need a repeat CT of the chest in a few weeks with pulmonology. Speech evaluation was deferred as she had a modified barium swallow at [facility] 1 week prior recommending thin liquids/ general diet. Leukocytosis trended down. Chronic conditions were monitored. Patient improved and was then discharged in stable condition with the following medications, instructions, and follow up. [facility] was resumed at discharge. Discharge Exam:  General Appearance: alert and oriented to person, place and time and in no acute distress  Skin: warm and dry- dried blisters to right lip  Head: normocephalic and atraumatic  Neck: neck supple and non tender without mass   Pulmonary/Chest: clear to auscultation bilaterally  Cardiovascular: normal rate, normal S1 and S2 and no carotid bruits  Abdomen: soft, non-tender, non-distended, normal bowel sounds, no masses or organomegaly  Extremities: no cyanosis, no clubbing and no edema  Neurologic: speech normal        I/O last 3 completed shifts: In: 2087 [P.O.:660; I.V.:1427]  Out: 3163 [Urine:1650]  No intake/output data recorded.       LABS:  Recent Labs     07/16/19  1508 07/17/19  1022   * 138   K 5.3* 3.5   CL 92* 100   CO2 28 26   BUN 9 6*   CREATININE 0.6 0.6   GLUCOSE 114* 87   CALCIUM 9.1 9.1       Recent Labs     07/16/19  1508

## 2019-07-18 NOTE — PROGRESS NOTES
[facility] Quality Flow/Interdisciplinary Rounds Progress Note        Quality Flow Rounds held on July 18, 2019    Disciplines Attending:  Bedside Nurse, ,  and Nursing Unit Leadership    Rafael Nguyễn was admitted on 7/16/2019  2:36 PM    Anticipated Discharge Date:  Expected Discharge Date: 07/19/19    Disposition:    Adan Score:  Adan Scale Score: 20    Readmission Risk              Risk of Unplanned Readmission:        11           Discussed patient goal for the day, patient clinical progression, and barriers to discharge.   The following Goal(s) of the Day/Commitment(s) have been identified:  IV antibiotics, PT/OT      Pratibha Haynes  July 18, 2019

## 2019-07-18 NOTE — PROGRESS NOTES
cyanosis, clubbing, edema  Lymph: No cervical LAD. No supraclavicular LAD. M/S: No cyanosis. No joint deformity. No clubbing. Neuro: Awake. Follows commands. Positive pupils/gag/corneals. Normal pain response. Results:  CBC:   Recent Labs     07/16/19  1508 07/17/19  1022   WBC 18.4* 15.9*   HGB 11.3* 12.3   HCT 35.6 39.1   MCV 94.4 95.4    336     BMP:   Recent Labs     07/16/19  1508 07/17/19  1022   * 138   K 5.3* 3.5   CL 92* 100   CO2 28 26   BUN 9 6*   CREATININE 0.6 0.6     LIVER PROFILE:   Recent Labs     07/16/19  1508   AST 42*   ALT 32   BILITOT 0.4   ALKPHOS 150*     PT/INR: No results for input(s): PROTIME, INR in the last 72 hours. APTT: No results for input(s): APTT in the last 72 hours. Pathology:  1. N/A      Microbiology:  1. None    Recent ABG:   No results for input(s): PH, PO2, PCO2, HCO3, BE, O2SAT, METHB, O2HB, COHB, O2CON, HHB, THB in the last 72 hours. Recent Films:  CT Chest WO Contrast   Final Result      1. Scattered areas of mixed interstitial and airspace opacity   bilaterally notable at lung bases are likely related to   bronchopneumonia. Clinical correlation recommended. Short-term   follow-up recommended to assure resolution. 2. Small bilateral pleural effusions. ALERT:  THIS IS AN ABNORMAL REPORT            XR CHEST PORTABLE   Final Result   ALERT:  THIS IS AN ABNORMAL REPORT   1. Possible mass/malignancy in the inferior lateral right upper lobe. Further evaluation with CT scan of the chest is recommended. 2. Bibasilar airspace disease, concerning for   infiltrate/pneumonia/atelectasis/scarring. Assessment:  1. Pneumonia, multilobar as seen by CT images above. , community-acquired. The pneumonia would have been correctly treated with Unasyn followed by Augmentin presuming the patient had aspiration. An underlying lesion is unlikely given the history but cannot be fully excluded.   Cryptogenic organizing pneumonia is a thought as well.        Plan:  1. Patient is to be discharged on Levaquin and Augmentin for complete coverage. The above infiltrates need to be followed as an outpatient. Follow-up with pulmonary is needed in a 3 to 4 weeks. At that time, repeat CT scan needs to be ordered and compared to previous.     Care reviewed with the staff and the patient's family as available. Please note that voice recognition technology was used in the preparation of this note and make therefore it may contain inadvertent transcription errors. Leticia Florez M.D., F.C.C.P.     [facility], [address] [place] [address], [address] [place] [address]

## 2019-07-18 NOTE — PROGRESS NOTES
supraclavicular LAD. M/S: No cyanosis. No joint deformity. No clubbing. Neuro: Awake. Follows commands. Positive pupils/gag/corneals. Normal pain response. Results:  CBC:   Recent Labs     07/16/19  1508 07/17/19  1022   WBC 18.4* 15.9*   HGB 11.3* 12.3   HCT 35.6 39.1   MCV 94.4 95.4    336     BMP:   Recent Labs     07/16/19  1508 07/17/19  1022   * 138   K 5.3* 3.5   CL 92* 100   CO2 28 26   BUN 9 6*   CREATININE 0.6 0.6     LIVER PROFILE:   Recent Labs     07/16/19  1508   AST 42*   ALT 32   BILITOT 0.4   ALKPHOS 150*     PT/INR: No results for input(s): PROTIME, INR in the last 72 hours. APTT: No results for input(s): APTT in the last 72 hours. Pathology:  1. N/A      Microbiology:  1. None    Recent ABG:   No results for input(s): PH, PO2, PCO2, HCO3, BE, O2SAT, METHB, O2HB, COHB, O2CON, HHB, THB in the last 72 hours. Recent Films:  CT Chest WO Contrast   Final Result      1. Scattered areas of mixed interstitial and airspace opacity   bilaterally notable at lung bases are likely related to   bronchopneumonia. Clinical correlation recommended. Short-term   follow-up recommended to assure resolution. 2. Small bilateral pleural effusions. ALERT:  THIS IS AN ABNORMAL REPORT            XR CHEST PORTABLE   Final Result   ALERT:  THIS IS AN ABNORMAL REPORT   1. Possible mass/malignancy in the inferior lateral right upper lobe. Further evaluation with CT scan of the chest is recommended. 2. Bibasilar airspace disease, concerning for   infiltrate/pneumonia/atelectasis/scarring. Assessment:  1. Pneumonia, multilobar as seen by CT images above. , community-acquired. The pneumonia would have been correctly treated with Unasyn followed by Augmentin presuming the patient had aspiration. An underlying lesion is unlikely given the history but cannot be fully excluded.        Plan:  1.  Zosyn which provides gram-negative coverage which the patient has not had

## 2019-07-18 NOTE — PROGRESS NOTES
Pharmacy Consultation Note  (Antibiotic Dosing and Monitoring)     Initial consult date: 7/16/2019  Consulting physician: Dr. Zambrano Record  Drug(s): Vancomycin  Indication: Pneumonia    Note vancomycin discontinued. Clinical pharmacy will sign-off. Please re-consult if we can be of further assistance.     Juan Luis Aguila, PharmD, [facility]  7/18/2019  11:18 AM

## 2019-07-18 NOTE — CARE COORDINATION
7/18/2019  Social Work Discharge Planning:  SW notified [address] with Ann Mann. C of Pts discharge. Electronically signed by CLAUDIA Galvan on 7/18/2019 at 12:31 PM

## 2019-07-19 ENCOUNTER — TELEPHONE (OUTPATIENT)
Dept: FAMILY MEDICINE CLINIC | Age: 84
End: 2019-07-19

## 2019-07-21 LAB
BLOOD CULTURE, ROUTINE: NORMAL
CULTURE, BLOOD 2: NORMAL

## 2019-07-24 ENCOUNTER — OFFICE VISIT (OUTPATIENT)
Dept: FAMILY MEDICINE CLINIC | Age: 84
End: 2019-07-24
Payer: MEDICARE

## 2019-07-24 VITALS
SYSTOLIC BLOOD PRESSURE: 133 MMHG | BODY MASS INDEX: 19.88 KG/M2 | HEART RATE: 86 BPM | TEMPERATURE: 97.8 F | OXYGEN SATURATION: 97 % | RESPIRATION RATE: 16 BRPM | WEIGHT: 108 LBS | DIASTOLIC BLOOD PRESSURE: 76 MMHG | HEIGHT: 62 IN

## 2019-07-24 DIAGNOSIS — J18.9 PNEUMONIA DUE TO INFECTIOUS ORGANISM, UNSPECIFIED LATERALITY, UNSPECIFIED PART OF LUNG: Primary | ICD-10-CM

## 2019-07-24 DIAGNOSIS — G20 PARKINSON'S DISEASE (HCC): ICD-10-CM

## 2019-07-24 PROCEDURE — 1111F DSCHRG MED/CURRENT MED MERGE: CPT | Performed by: FAMILY MEDICINE

## 2019-07-24 PROCEDURE — 99496 TRANSJ CARE MGMT HIGH F2F 7D: CPT | Performed by: FAMILY MEDICINE

## 2019-07-24 NOTE — PROGRESS NOTES
Transitional Care Management    Fatigue       History of Present illness - Follow up of Hospital diagnosis(es): pneumonia    Inpatient course: Discharge summary reviewed- see chart. Interval history/Current status: Pt is still weak but feeling stronger each day. She is coughing but less than before. Tessalon is not helping. She is slowly getting her appetite back. Normal BM and urination. Abx done tomorrow. A comprehensive review of systems was negative except for what was noted in the HPI. Vitals:    07/24/19 1343   BP: 133/76   Pulse: 86   Resp: 16   Temp: 97.8 °F (36.6 °C)   TempSrc: Oral   SpO2: 97%   Weight: 108 lb (49 kg)   Height: 5' 2\" (1.575 m)     Body mass index is 19.75 kg/m².    Wt Readings from Last 3 Encounters:   07/24/19 108 lb (49 kg)   07/18/19 110 lb 1.6 oz (49.9 kg)   03/18/19 114 lb 12.8 oz (52.1 kg)     BP Readings from Last 3 Encounters:   07/24/19 133/76   07/18/19 (!) 149/59   03/18/19 121/70        Physical Exam:  General Appearance: alert and oriented to person, place and time, well developed and well- nourished, in no acute distress  Skin: warm and dry, no rash or erythema  Head: normocephalic and atraumatic  Eyes: pupils equal, round, and reactive to light, extraocular eye movements intact, conjunctivae normal  ENT: tympanic membrane, external ear and ear canal normal bilaterally, nose without deformity, nasal mucosa and turbinates normal without polyps  Neck: supple and non-tender without mass, no thyromegaly or thyroid nodules, no cervical lymphadenopathy  Pulmonary/Chest: clear to auscultation bilaterally- no wheezes, rales or rhonchi, normal air movement, no respiratory distress  Cardiovascular: normal rate, regular rhythm, normal S1 and S2, no murmurs, rubs, clicks, or gallops, distal pulses intact, no carotid bruits  Abdomen: soft, non-tender, non-distended, normal bowel sounds, no masses or organomegaly  Extremities: no cyanosis, clubbing or edema  Musculoskeletal:

## 2019-08-02 ENCOUNTER — TELEPHONE (OUTPATIENT)
Dept: FAMILY MEDICINE CLINIC | Age: 84
End: 2019-08-02

## 2019-08-02 NOTE — TELEPHONE ENCOUNTER
Debbie De Souza from [facility] called to notify you that they have decreased skilled nursing to once weekly per patient request.

## 2019-08-29 ENCOUNTER — OFFICE VISIT (OUTPATIENT)
Dept: FAMILY MEDICINE CLINIC | Age: 84
End: 2019-08-29
Payer: MEDICARE

## 2019-08-29 VITALS
OXYGEN SATURATION: 97 % | DIASTOLIC BLOOD PRESSURE: 71 MMHG | WEIGHT: 108.2 LBS | BODY MASS INDEX: 19.91 KG/M2 | TEMPERATURE: 98.4 F | HEART RATE: 76 BPM | HEIGHT: 62 IN | RESPIRATION RATE: 16 BRPM | SYSTOLIC BLOOD PRESSURE: 117 MMHG

## 2019-08-29 DIAGNOSIS — E78.5 HYPERLIPIDEMIA, UNSPECIFIED HYPERLIPIDEMIA TYPE: ICD-10-CM

## 2019-08-29 DIAGNOSIS — G20 PARKINSON'S DISEASE (HCC): ICD-10-CM

## 2019-08-29 DIAGNOSIS — T17.908A ASPIRATION INTO AIRWAY, INITIAL ENCOUNTER: ICD-10-CM

## 2019-08-29 DIAGNOSIS — J18.9 PNEUMONIA DUE TO INFECTIOUS ORGANISM, UNSPECIFIED LATERALITY, UNSPECIFIED PART OF LUNG: Primary | ICD-10-CM

## 2019-08-29 DIAGNOSIS — I10 ESSENTIAL HYPERTENSION: ICD-10-CM

## 2019-08-29 PROCEDURE — 1036F TOBACCO NON-USER: CPT | Performed by: FAMILY MEDICINE

## 2019-08-29 PROCEDURE — 4040F PNEUMOC VAC/ADMIN/RCVD: CPT | Performed by: FAMILY MEDICINE

## 2019-08-29 PROCEDURE — G8420 CALC BMI NORM PARAMETERS: HCPCS | Performed by: FAMILY MEDICINE

## 2019-08-29 PROCEDURE — G8399 PT W/DXA RESULTS DOCUMENT: HCPCS | Performed by: FAMILY MEDICINE

## 2019-08-29 PROCEDURE — 99213 OFFICE O/P EST LOW 20 MIN: CPT | Performed by: FAMILY MEDICINE

## 2019-08-29 PROCEDURE — G8427 DOCREV CUR MEDS BY ELIG CLIN: HCPCS | Performed by: FAMILY MEDICINE

## 2019-08-29 PROCEDURE — 1123F ACP DISCUSS/DSCN MKR DOCD: CPT | Performed by: FAMILY MEDICINE

## 2019-08-29 PROCEDURE — 1090F PRES/ABSN URINE INCON ASSESS: CPT | Performed by: FAMILY MEDICINE

## 2019-08-29 NOTE — PROGRESS NOTES
Chief Complaint   Patient presents with    Follow-up    Pneumonia       HPI:  Patient is here for follow-up of pneumonia. Patient states she is feeling much better. Symptoms have resolved with a residual dry cough. She denies SOB, wheezing. Patient's past medical, surgical, social and/or family history reviewed, updated in chart, and are non-contributory (unless otherwise stated). Medications and allergies also reviewed and updated in chart. Review of Systems:  Constitutional:  No fever, no fatigue, no chills, no headaches, no weight change  Dermatology:  No rash, no mole, no dry or sensitive skin  ENT:  No cough, no sore throat, no sinus pain, no runny nose, no ear pain  Cardiology:  No chest pain, no palpitations, no leg edema, no shortness of breath, no PND  Gastroenterology:  No dysphagia, no abdominal pain, no nausea, no vomiting, no constipation, no diarrhea, no heartburn  Musculoskeletal:  No joint pain, no leg cramps, no back pain, no muscle aches  Respiratory:  No shortness of breath, no orthopnea, no wheezing, no HARRLEL, no hemoptysis  Urology:  No blood in the urine, no urinary frequency, no urinary incontinence, no urinary urgency, no nocturia, no dysuria  Vitals:    08/29/19 1111   BP: 117/71   Pulse: 76   Resp: 16   Temp: 98.4 °F (36.9 °C)   TempSrc: Oral   SpO2: 97%   Weight: 108 lb 3.2 oz (49.1 kg)   Height: 5' 2\" (1.575 m)       General:  Patient alert and oriented x 3, NAD, pleasant  HEENT:  Atraumatic, normocephalic, PERRLA, EOMI, clear conjunctiva, TMs clear, nose-clear, throat - no erythema  Neck:  Supple, no goiter, no carotid bruits, no lymphadenopathy  Lungs:  CTA B  Heart:  RRR, no murmurs, gallops or rubs  Abdomen:  Soft/nt/nd, + bowel sounds  Extremities:  No clubbing, cyanosis or edema  Skin: unremarkable    Assessment/Plan:      Ruma was seen today for follow-up and pneumonia.     Diagnoses and all orders for this visit:    Pneumonia due to infectious organism,

## 2019-09-16 ENCOUNTER — HOSPITAL ENCOUNTER (OUTPATIENT)
Age: 84
Discharge: HOME | End: 2019-09-18
Payer: MEDICARE

## 2019-09-16 ENCOUNTER — NURSE ONLY (OUTPATIENT)
Dept: FAMILY MEDICINE CLINIC | Age: 84
End: 2019-09-16
Payer: MEDICARE

## 2019-09-16 DIAGNOSIS — E78.5 HYPERLIPIDEMIA, UNSPECIFIED HYPERLIPIDEMIA TYPE: ICD-10-CM

## 2019-09-16 DIAGNOSIS — I10 ESSENTIAL HYPERTENSION: ICD-10-CM

## 2019-09-16 LAB
BASOPHILS ABSOLUTE: 0.05 E9/L (ref 0–0.2)
BASOPHILS RELATIVE PERCENT: 0.8 % (ref 0–2)
EOSINOPHILS ABSOLUTE: 0.11 E9/L (ref 0.05–0.5)
EOSINOPHILS RELATIVE PERCENT: 1.8 % (ref 0–6)
HCT VFR BLD CALC: 40.6 % (ref 34–48)
HEMOGLOBIN: 12.7 G/DL (ref 11.5–15.5)
IMMATURE GRANULOCYTES #: 0.02 E9/L
IMMATURE GRANULOCYTES %: 0.3 % (ref 0–5)
LYMPHOCYTES ABSOLUTE: 1.24 E9/L (ref 1.5–4)
LYMPHOCYTES RELATIVE PERCENT: 20.4 % (ref 20–42)
MCH RBC QN AUTO: 31.6 PG (ref 26–35)
MCHC RBC AUTO-ENTMCNC: 31.3 % (ref 32–34.5)
MCV RBC AUTO: 101 FL (ref 80–99.9)
MONOCYTES ABSOLUTE: 0.48 E9/L (ref 0.1–0.95)
MONOCYTES RELATIVE PERCENT: 7.9 % (ref 2–12)
NEUTROPHILS ABSOLUTE: 4.19 E9/L (ref 1.8–7.3)
NEUTROPHILS RELATIVE PERCENT: 68.8 % (ref 43–80)
PDW BLD-RTO: 14.8 FL (ref 11.5–15)
PLATELET # BLD: 195 E9/L (ref 130–450)
PMV BLD AUTO: 10.2 FL (ref 7–12)
RBC # BLD: 4.02 E12/L (ref 3.5–5.5)
WBC # BLD: 6.1 E9/L (ref 4.5–11.5)

## 2019-09-16 PROCEDURE — 80061 LIPID PANEL: CPT

## 2019-09-16 PROCEDURE — 36415 COLL VENOUS BLD VENIPUNCTURE: CPT | Performed by: FAMILY MEDICINE

## 2019-09-16 PROCEDURE — 84443 ASSAY THYROID STIM HORMONE: CPT

## 2019-09-16 PROCEDURE — 85025 COMPLETE CBC W/AUTO DIFF WBC: CPT

## 2019-09-16 PROCEDURE — 80053 COMPREHEN METABOLIC PANEL: CPT

## 2019-09-17 LAB
ALBUMIN SERPL-MCNC: 4.3 G/DL (ref 3.5–5.2)
ALP BLD-CCNC: 74 U/L (ref 35–104)
ALT SERPL-CCNC: 10 U/L (ref 0–32)
ANION GAP SERPL CALCULATED.3IONS-SCNC: 13 MMOL/L (ref 7–16)
AST SERPL-CCNC: 36 U/L (ref 0–31)
BILIRUB SERPL-MCNC: 0.7 MG/DL (ref 0–1.2)
BUN BLDV-MCNC: 18 MG/DL (ref 8–23)
CALCIUM SERPL-MCNC: 9.8 MG/DL (ref 8.6–10.2)
CHLORIDE BLD-SCNC: 98 MMOL/L (ref 98–107)
CHOLESTEROL, TOTAL: 189 MG/DL (ref 0–199)
CO2: 27 MMOL/L (ref 22–29)
CREAT SERPL-MCNC: 0.8 MG/DL (ref 0.5–1)
GFR AFRICAN AMERICAN: >60
GFR NON-AFRICAN AMERICAN: >60 ML/MIN/1.73
GLUCOSE BLD-MCNC: 100 MG/DL (ref 74–99)
HDLC SERPL-MCNC: 87 MG/DL
LDL CHOLESTEROL CALCULATED: 88 MG/DL (ref 0–99)
POTASSIUM SERPL-SCNC: 5.4 MMOL/L (ref 3.5–5)
SODIUM BLD-SCNC: 138 MMOL/L (ref 132–146)
TOTAL PROTEIN: 7.7 G/DL (ref 6.4–8.3)
TRIGL SERPL-MCNC: 69 MG/DL (ref 0–149)
TSH SERPL DL<=0.05 MIU/L-ACNC: 5.07 UIU/ML (ref 0.27–4.2)
VLDLC SERPL CALC-MCNC: 14 MG/DL

## 2019-09-21 ENCOUNTER — HOSPITAL ENCOUNTER (OUTPATIENT)
Dept: CT IMAGING | Age: 84
Discharge: HOME | End: 2019-09-23
Payer: MEDICARE

## 2019-09-21 DIAGNOSIS — R91.8 LUNG MASS: ICD-10-CM

## 2019-09-21 PROCEDURE — 71250 CT THORAX DX C-: CPT

## 2019-10-07 ENCOUNTER — TELEPHONE (OUTPATIENT)
Dept: FAMILY MEDICINE CLINIC | Age: 84
End: 2019-10-07

## 2019-10-07 RX ORDER — ALBUTEROL SULFATE 90 UG/1
2 AEROSOL, METERED RESPIRATORY (INHALATION) EVERY 6 HOURS PRN
Qty: 1 INHALER | Refills: 3 | Status: SHIPPED | OUTPATIENT
Start: 2019-10-07

## 2019-10-24 RX ORDER — VALSARTAN AND HYDROCHLOROTHIAZIDE 160; 12.5 MG/1; MG/1
1 TABLET, FILM COATED ORAL DAILY
Qty: 90 TABLET | Refills: 0 | Status: SHIPPED | OUTPATIENT
Start: 2019-10-24 | End: 2019-12-12 | Stop reason: SDUPTHER

## 2019-12-06 RX ORDER — SIMVASTATIN 40 MG
TABLET ORAL
Qty: 90 TABLET | Refills: 1 | Status: SHIPPED
Start: 2019-12-06 | End: 2020-01-01

## 2019-12-12 RX ORDER — VALSARTAN AND HYDROCHLOROTHIAZIDE 160; 12.5 MG/1; MG/1
1 TABLET, FILM COATED ORAL DAILY
Qty: 90 TABLET | Refills: 0 | Status: SHIPPED
Start: 2019-12-12 | End: 2020-03-13

## 2020-01-01 ENCOUNTER — NURSE ONLY (OUTPATIENT)
Dept: FAMILY MEDICINE CLINIC | Age: 85
End: 2020-01-01
Payer: MEDICARE

## 2020-01-01 ENCOUNTER — HOSPITAL ENCOUNTER (OUTPATIENT)
Dept: CT IMAGING | Age: 85
Discharge: HOME | End: 2020-07-12
Payer: MEDICARE

## 2020-01-01 ENCOUNTER — OFFICE VISIT (OUTPATIENT)
Dept: FAMILY MEDICINE CLINIC | Age: 85
End: 2020-01-01
Payer: MEDICARE

## 2020-01-01 ENCOUNTER — HOSPITAL ENCOUNTER (OUTPATIENT)
Age: 85
Discharge: HOME | End: 2020-06-19
Payer: MEDICARE

## 2020-01-01 VITALS
WEIGHT: 113.2 LBS | HEART RATE: 72 BPM | RESPIRATION RATE: 16 BRPM | OXYGEN SATURATION: 99 % | HEIGHT: 62 IN | SYSTOLIC BLOOD PRESSURE: 129 MMHG | TEMPERATURE: 97.2 F | DIASTOLIC BLOOD PRESSURE: 72 MMHG | BODY MASS INDEX: 20.83 KG/M2

## 2020-01-01 VITALS
WEIGHT: 107.2 LBS | RESPIRATION RATE: 16 BRPM | SYSTOLIC BLOOD PRESSURE: 144 MMHG | HEIGHT: 62 IN | HEART RATE: 70 BPM | DIASTOLIC BLOOD PRESSURE: 74 MMHG | BODY MASS INDEX: 19.73 KG/M2 | TEMPERATURE: 97.2 F | OXYGEN SATURATION: 99 %

## 2020-01-01 DIAGNOSIS — I10 ESSENTIAL HYPERTENSION: ICD-10-CM

## 2020-01-01 DIAGNOSIS — R53.83 FATIGUE, UNSPECIFIED TYPE: ICD-10-CM

## 2020-01-01 LAB
ALBUMIN SERPL-MCNC: 4.5 G/DL (ref 3.5–5.2)
ALBUMIN SERPL-MCNC: 4.5 G/DL (ref 3.5–5.2)
ALP BLD-CCNC: 71 U/L (ref 35–104)
ALP BLD-CCNC: 72 U/L (ref 35–104)
ALT SERPL-CCNC: 10 U/L (ref 0–32)
ALT SERPL-CCNC: 16 U/L (ref 0–32)
ANION GAP SERPL CALCULATED.3IONS-SCNC: 11 MMOL/L (ref 7–16)
ANION GAP SERPL CALCULATED.3IONS-SCNC: 14 MMOL/L (ref 7–16)
AST SERPL-CCNC: 34 U/L (ref 0–31)
AST SERPL-CCNC: 35 U/L (ref 0–31)
BASOPHILS ABSOLUTE: 0.04 E9/L (ref 0–0.2)
BASOPHILS ABSOLUTE: 0.04 E9/L (ref 0–0.2)
BASOPHILS RELATIVE PERCENT: 0.7 % (ref 0–2)
BASOPHILS RELATIVE PERCENT: 0.7 % (ref 0–2)
BILIRUB SERPL-MCNC: 0.6 MG/DL (ref 0–1.2)
BILIRUB SERPL-MCNC: 0.6 MG/DL (ref 0–1.2)
BILIRUBIN, POC: NORMAL
BLOOD URINE, POC: NORMAL
BUN BLDV-MCNC: 20 MG/DL (ref 8–23)
BUN BLDV-MCNC: 22 MG/DL (ref 8–23)
CALCIUM SERPL-MCNC: 9.5 MG/DL (ref 8.6–10.2)
CALCIUM SERPL-MCNC: 9.8 MG/DL (ref 8.6–10.2)
CHLORIDE BLD-SCNC: 100 MMOL/L (ref 98–107)
CHLORIDE BLD-SCNC: 99 MMOL/L (ref 98–107)
CHOLESTEROL, TOTAL: 154 MG/DL (ref 0–199)
CHOLESTEROL, TOTAL: 164 MG/DL (ref 0–199)
CLARITY, POC: YELLOW
CO2: 24 MMOL/L (ref 22–29)
CO2: 28 MMOL/L (ref 22–29)
COLOR, POC: CLEAR
CREAT SERPL-MCNC: 0.8 MG/DL (ref 0.5–1)
CREAT SERPL-MCNC: 1.1 MG/DL (ref 0.5–1)
EOSINOPHILS ABSOLUTE: 0.09 E9/L (ref 0.05–0.5)
EOSINOPHILS ABSOLUTE: 0.12 E9/L (ref 0.05–0.5)
EOSINOPHILS RELATIVE PERCENT: 1.6 % (ref 0–6)
EOSINOPHILS RELATIVE PERCENT: 2 % (ref 0–6)
FOLATE: >20 NG/ML (ref 4.8–24.2)
GFR AFRICAN AMERICAN: 57
GFR AFRICAN AMERICAN: >60
GFR NON-AFRICAN AMERICAN: 47 ML/MIN/1.73
GFR NON-AFRICAN AMERICAN: >60 ML/MIN/1.73
GLUCOSE BLD-MCNC: 103 MG/DL (ref 74–99)
GLUCOSE BLD-MCNC: 87 MG/DL (ref 74–99)
GLUCOSE URINE, POC: NORMAL
HCT VFR BLD CALC: 39.4 % (ref 34–48)
HCT VFR BLD CALC: 39.4 % (ref 34–48)
HDLC SERPL-MCNC: 77 MG/DL
HDLC SERPL-MCNC: 87 MG/DL
HEMOGLOBIN: 12 G/DL (ref 11.5–15.5)
HEMOGLOBIN: 12.6 G/DL (ref 11.5–15.5)
IMMATURE GRANULOCYTES #: 0.01 E9/L
IMMATURE GRANULOCYTES #: 0.02 E9/L
IMMATURE GRANULOCYTES %: 0.2 % (ref 0–5)
IMMATURE GRANULOCYTES %: 0.3 % (ref 0–5)
KETONES, POC: NORMAL
LDL CHOLESTEROL CALCULATED: 63 MG/DL (ref 0–99)
LDL CHOLESTEROL CALCULATED: 64 MG/DL (ref 0–99)
LEUKOCYTE EST, POC: NORMAL
LYMPHOCYTES ABSOLUTE: 1.09 E9/L (ref 1.5–4)
LYMPHOCYTES ABSOLUTE: 1.17 E9/L (ref 1.5–4)
LYMPHOCYTES RELATIVE PERCENT: 18.3 % (ref 20–42)
LYMPHOCYTES RELATIVE PERCENT: 20.3 % (ref 20–42)
MCH RBC QN AUTO: 30 PG (ref 26–35)
MCH RBC QN AUTO: 32.1 PG (ref 26–35)
MCHC RBC AUTO-ENTMCNC: 30.5 % (ref 32–34.5)
MCHC RBC AUTO-ENTMCNC: 32 % (ref 32–34.5)
MCV RBC AUTO: 100.5 FL (ref 80–99.9)
MCV RBC AUTO: 98.5 FL (ref 80–99.9)
MONOCYTES ABSOLUTE: 0.35 E9/L (ref 0.1–0.95)
MONOCYTES ABSOLUTE: 0.49 E9/L (ref 0.1–0.95)
MONOCYTES RELATIVE PERCENT: 6.1 % (ref 2–12)
MONOCYTES RELATIVE PERCENT: 8.2 % (ref 2–12)
NEUTROPHILS ABSOLUTE: 4.09 E9/L (ref 1.8–7.3)
NEUTROPHILS ABSOLUTE: 4.2 E9/L (ref 1.8–7.3)
NEUTROPHILS RELATIVE PERCENT: 70.5 % (ref 43–80)
NEUTROPHILS RELATIVE PERCENT: 71.1 % (ref 43–80)
NITRITE, POC: NORMAL
ORGANISM: ABNORMAL
PDW BLD-RTO: 12.8 FL (ref 11.5–15)
PDW BLD-RTO: 13 FL (ref 11.5–15)
PH, POC: 6
PLATELET # BLD: 179 E9/L (ref 130–450)
PLATELET # BLD: 192 E9/L (ref 130–450)
PMV BLD AUTO: 10.1 FL (ref 7–12)
PMV BLD AUTO: 10.4 FL (ref 7–12)
POTASSIUM SERPL-SCNC: 4.3 MMOL/L (ref 3.5–5)
POTASSIUM SERPL-SCNC: 4.4 MMOL/L (ref 3.5–5)
PROTEIN, POC: NORMAL
RBC # BLD: 3.92 E12/L (ref 3.5–5.5)
RBC # BLD: 4 E12/L (ref 3.5–5.5)
SODIUM BLD-SCNC: 138 MMOL/L (ref 132–146)
SODIUM BLD-SCNC: 138 MMOL/L (ref 132–146)
SPECIFIC GRAVITY, POC: 1.02
T4 FREE: 1.27 NG/DL (ref 0.93–1.7)
TOTAL PROTEIN: 7.7 G/DL (ref 6.4–8.3)
TOTAL PROTEIN: 8.1 G/DL (ref 6.4–8.3)
TRIGL SERPL-MCNC: 66 MG/DL (ref 0–149)
TRIGL SERPL-MCNC: 70 MG/DL (ref 0–149)
TSH SERPL DL<=0.05 MIU/L-ACNC: 2.76 UIU/ML (ref 0.27–4.2)
TSH SERPL DL<=0.05 MIU/L-ACNC: 3.44 UIU/ML (ref 0.27–4.2)
URINE CULTURE, ROUTINE: ABNORMAL
UROBILINOGEN, POC: 0.2
VITAMIN B-12: >2000 PG/ML (ref 211–946)
VLDLC SERPL CALC-MCNC: 13 MG/DL
VLDLC SERPL CALC-MCNC: 14 MG/DL
WBC # BLD: 5.8 E9/L (ref 4.5–11.5)
WBC # BLD: 6 E9/L (ref 4.5–11.5)

## 2020-01-01 PROCEDURE — 1123F ACP DISCUSS/DSCN MKR DOCD: CPT | Performed by: FAMILY MEDICINE

## 2020-01-01 PROCEDURE — 87088 URINE BACTERIA CULTURE: CPT

## 2020-01-01 PROCEDURE — 4040F PNEUMOC VAC/ADMIN/RCVD: CPT | Performed by: FAMILY MEDICINE

## 2020-01-01 PROCEDURE — 80061 LIPID PANEL: CPT

## 2020-01-01 PROCEDURE — 99214 OFFICE O/P EST MOD 30 MIN: CPT | Performed by: FAMILY MEDICINE

## 2020-01-01 PROCEDURE — 85025 COMPLETE CBC W/AUTO DIFF WBC: CPT

## 2020-01-01 PROCEDURE — 36415 COLL VENOUS BLD VENIPUNCTURE: CPT | Performed by: FAMILY MEDICINE

## 2020-01-01 PROCEDURE — 87186 SC STD MICRODIL/AGAR DIL: CPT

## 2020-01-01 PROCEDURE — G8399 PT W/DXA RESULTS DOCUMENT: HCPCS | Performed by: FAMILY MEDICINE

## 2020-01-01 PROCEDURE — 80053 COMPREHEN METABOLIC PANEL: CPT

## 2020-01-01 PROCEDURE — G8484 FLU IMMUNIZE NO ADMIN: HCPCS | Performed by: FAMILY MEDICINE

## 2020-01-01 PROCEDURE — 84443 ASSAY THYROID STIM HORMONE: CPT

## 2020-01-01 PROCEDURE — 71250 CT THORAX DX C-: CPT

## 2020-01-01 PROCEDURE — G8427 DOCREV CUR MEDS BY ELIG CLIN: HCPCS | Performed by: FAMILY MEDICINE

## 2020-01-01 PROCEDURE — 87077 CULTURE AEROBIC IDENTIFY: CPT

## 2020-01-01 PROCEDURE — 81002 URINALYSIS NONAUTO W/O SCOPE: CPT | Performed by: FAMILY MEDICINE

## 2020-01-01 PROCEDURE — G8420 CALC BMI NORM PARAMETERS: HCPCS | Performed by: FAMILY MEDICINE

## 2020-01-01 PROCEDURE — 1036F TOBACCO NON-USER: CPT | Performed by: FAMILY MEDICINE

## 2020-01-01 PROCEDURE — G0439 PPPS, SUBSEQ VISIT: HCPCS | Performed by: FAMILY MEDICINE

## 2020-01-01 PROCEDURE — 1090F PRES/ABSN URINE INCON ASSESS: CPT | Performed by: FAMILY MEDICINE

## 2020-01-01 RX ORDER — SIMVASTATIN 40 MG
TABLET ORAL
Qty: 90 TABLET | Refills: 1 | Status: SHIPPED
Start: 2020-01-01 | End: 2020-01-01

## 2020-01-01 RX ORDER — SULFAMETHOXAZOLE AND TRIMETHOPRIM 800; 160 MG/1; MG/1
1 TABLET ORAL 2 TIMES DAILY
Qty: 14 TABLET | Refills: 0 | Status: SHIPPED | OUTPATIENT
Start: 2020-01-01 | End: 2020-01-01

## 2020-01-01 RX ORDER — SIMVASTATIN 40 MG
TABLET ORAL
Qty: 90 TABLET | Refills: 3 | Status: SHIPPED | OUTPATIENT
Start: 2020-01-01

## 2020-01-01 RX ORDER — VALSARTAN AND HYDROCHLOROTHIAZIDE 160; 12.5 MG/1; MG/1
1 TABLET, FILM COATED ORAL DAILY
Qty: 90 TABLET | Refills: 3 | Status: ON HOLD
Start: 2020-01-01 | End: 2021-01-01 | Stop reason: HOSPADM

## 2020-01-01 RX ORDER — VALSARTAN AND HYDROCHLOROTHIAZIDE 160; 12.5 MG/1; MG/1
1 TABLET, FILM COATED ORAL DAILY
Qty: 90 TABLET | Refills: 0 | Status: SHIPPED
Start: 2020-01-01 | End: 2020-01-01

## 2020-01-01 SDOH — ECONOMIC STABILITY: FOOD INSECURITY: WITHIN THE PAST 12 MONTHS, THE FOOD YOU BOUGHT JUST DIDN'T LAST AND YOU DIDN'T HAVE MONEY TO GET MORE.: NEVER TRUE

## 2020-01-01 SDOH — ECONOMIC STABILITY: FOOD INSECURITY: WITHIN THE PAST 12 MONTHS, YOU WORRIED THAT YOUR FOOD WOULD RUN OUT BEFORE YOU GOT MONEY TO BUY MORE.: NEVER TRUE

## 2020-01-01 SDOH — ECONOMIC STABILITY: INCOME INSECURITY: HOW HARD IS IT FOR YOU TO PAY FOR THE VERY BASICS LIKE FOOD, HOUSING, MEDICAL CARE, AND HEATING?: NOT HARD AT ALL

## 2020-01-01 SDOH — ECONOMIC STABILITY: TRANSPORTATION INSECURITY
IN THE PAST 12 MONTHS, HAS THE LACK OF TRANSPORTATION KEPT YOU FROM MEDICAL APPOINTMENTS OR FROM GETTING MEDICATIONS?: NO

## 2020-01-01 SDOH — ECONOMIC STABILITY: TRANSPORTATION INSECURITY
IN THE PAST 12 MONTHS, HAS LACK OF TRANSPORTATION KEPT YOU FROM MEETINGS, WORK, OR FROM GETTING THINGS NEEDED FOR DAILY LIVING?: NO

## 2020-01-01 ASSESSMENT — LIFESTYLE VARIABLES: HOW OFTEN DO YOU HAVE A DRINK CONTAINING ALCOHOL: 0

## 2020-01-01 ASSESSMENT — PATIENT HEALTH QUESTIONNAIRE - PHQ9
SUM OF ALL RESPONSES TO PHQ QUESTIONS 1-9: 0
SUM OF ALL RESPONSES TO PHQ QUESTIONS 1-9: 0

## 2020-01-17 ENCOUNTER — TELEPHONE (OUTPATIENT)
Dept: FAMILY MEDICINE CLINIC | Age: 85
End: 2020-01-17

## 2020-02-03 ENCOUNTER — TELEPHONE (OUTPATIENT)
Dept: ADMINISTRATIVE | Age: 85
End: 2020-02-03

## 2020-03-13 RX ORDER — VALSARTAN AND HYDROCHLOROTHIAZIDE 160; 12.5 MG/1; MG/1
1 TABLET, FILM COATED ORAL DAILY
Qty: 90 TABLET | Refills: 0 | Status: SHIPPED
Start: 2020-03-13 | End: 2020-01-01

## 2020-06-17 NOTE — PATIENT INSTRUCTIONS
Personalized Preventive Plan for Serene Fulton - 6/17/2020  Medicare offers a range of preventive health benefits. Some of the tests and screenings are paid in full while other may be subject to a deductible, co-insurance, and/or copay. Some of these benefits include a comprehensive review of your medical history including lifestyle, illnesses that may run in your family, and various assessments and screenings as appropriate. After reviewing your medical record and screening and assessments performed today your provider may have ordered immunizations, labs, imaging, and/or referrals for you. A list of these orders (if applicable) as well as your Preventive Care list are included within your After Visit Summary for your review. Other Preventive Recommendations:    · A preventive eye exam performed by an eye specialist is recommended every 1-2 years to screen for glaucoma; cataracts, macular degeneration, and other eye disorders. · A preventive dental visit is recommended every 6 months. · Try to get at least 150 minutes of exercise per week or 10,000 steps per day on a pedometer . · Order or download the FREE \"Exercise & Physical Activity: Your Everyday Guide\" from [employer]. Call 6-133.379.9491 or search [employer] online. · You need 8394-5460 mg of calcium and 3129-4130 IU of vitamin D per day. It is possible to meet your calcium requirement with diet alone, but a vitamin D supplement is usually necessary to meet this goal.  · When exposed to the sun, use a sunscreen that protects against both UVA and UVB radiation with an SPF of 30 or greater. Reapply every 2 to 3 hours or after sweating, drying off with a towel, or swimming. · Always wear a seat belt when traveling in a car. Always wear a helmet when riding a bicycle or motorcycle.

## 2020-06-17 NOTE — PROGRESS NOTES
Cyndie Lopez MD         Past Medical History:   Diagnosis Date    Asthma     Hyperlipidemia     Hypertension     Parkinson disease [facility])        Past Surgical History:   Procedure Laterality Date    COLONOSCOPY  10/2010    HYSTERECTOMY      UPPER GASTROINTESTINAL ENDOSCOPY  2010         Family History   Problem Relation Age of Onset    Heart Disease Mother [de-identified]    Heart Disease Father     Cancer Father 76        lung       CareTeam (Including outside providers/suppliers regularly involved in providing care):   Patient Care Team:  Tori De Oliveira MD as PCP - General (Family Medicine)  Tori De Oliveira MD as PCP - [facility] Empaneled Provider  Nellie Pena MD as Consulting Physician (Pulmonology)    Wt Readings from Last 3 Encounters:   06/17/20 107 lb 3.2 oz (48.6 kg)   08/29/19 108 lb 3.2 oz (49.1 kg)   08/14/19 108 lb (49 kg)     Vitals:    06/17/20 1025 06/17/20 1030   BP: (!) 143/70 (!) 144/74   Pulse: 70    Resp: 16    Temp: 97.2 °F (36.2 °C)    TempSrc: Temporal    SpO2: 99%    Weight: 107 lb 3.2 oz (48.6 kg)    Height: 5' 2\" (1.575 m)      Body mass index is 19.61 kg/m². Based upon direct observation of the patient, evaluation of cognition reveals recent and remote memory intact.     General Appearance: alert and oriented to person, place and time, well developed and well- nourished, in no acute distress  Skin: warm and dry, no rash or erythema  Head: normocephalic and atraumatic  Eyes: pupils equal, round, and reactive to light, extraocular eye movements intact, conjunctivae normal  ENT: tympanic membrane, external ear and ear canal normal bilaterally, nose without deformity, nasal mucosa and turbinates normal without polyps  Neck: supple and non-tender without mass, no thyromegaly or thyroid nodules, no cervical lymphadenopathy  Pulmonary/Chest: clear to auscultation bilaterally- no wheezes, rales or rhonchi, normal air movement, no respiratory distress  Cardiovascular: normal rate, regular rhythm, normal S1 and S2, no murmurs, rubs, clicks, or gallops, distal pulses intact, no carotid bruits  Abdomen: soft, non-tender, non-distended, normal bowel sounds, no masses or organomegaly  Extremities: no cyanosis, clubbing or edema  Musculoskeletal: normal range of motion, no joint swelling, deformity or tenderness  Neurologic: reflexes normal and symmetric, no cranial nerve deficit, gait, coordination and speech normal    Patient's complete Health Risk Assessment and screening values have been reviewed and are found in Flowsheets. The following problems were reviewed today and where indicated follow up appointments were made and/or referrals ordered. Positive Risk Factor Screenings with Interventions:     Fall Risk:  Timed Up and Go Test > 12 seconds? (Complete if either Fall Risk answers are Yes): no  2 or more falls in past year?: (!) yes  Fall with injury in past year?: no  Fall Risk Interventions:    · Home safety tips provided    Health Habits/Nutrition:  Health Habits/Nutrition  Do you exercise for at least 20 minutes 2-3 times per week?: Yes  Have you lost any weight without trying in the past 3 months?: (!) Yes  Do you eat fewer than 2 meals per day?: No  Have you seen a dentist within the past year?: Yes  Body mass index is 19.61 kg/m².   Health Habits/Nutrition Interventions:  · weight is the same for the last year    Hearing/Vision:  No exam data present  Hearing/Vision  Do you or your family notice any trouble with your hearing?: No  Do you have difficulty driving, watching TV, or doing any of your daily activities because of your eyesight?: No  Have you had an eye exam within the past year?: (!) No  Hearing/Vision Interventions:  · Vision concerns:  patient encouraged to make appointment with his/her eye specialist, patient declines any further evaluation/treatment for this issue    Safety:  Safety  Do you have working smoke detectors?: Yes  Have all throw rugs been removed or fastened?: (!) No  Do you have non-slip mats or surfaces in all bathtubs/showers?: (!) No  Do all of your stairways have a railing or banister?: Yes  Are your doorways, halls and stairs free of clutter?: Yes  Do you always fasten your seatbelt when you are in a car?: Yes  Safety Interventions:  · Home safety tips provided    ADL:  ADLs  In the past 7 days, did you need help from others to perform any of the following everyday activities? Eating, dressing, grooming, bathing, toileting, or walking/balance?: (!) Walking/Balance  In the past 7 days, did you need help from others to take care of any of the following? Laundry, housekeeping, banking/finances, shopping, telephone use, food preparation, transportation, or taking medications?: Henok Aggarwal  ADL Interventions:  · Patient declines any further evaluation/treatment for this issue    Personalized Preventive Plan   Current Health Maintenance Status  Immunization History   Administered Date(s) Administered    Influenza 09/14/2012    Influenza Virus Vaccine 09/10/2013, 09/29/2014, 09/15/2016    Influenza, Triv, inactivated, subunit, adjuvanted, IM (Fluad 65 yrs and older) 09/13/2018    Pneumococcal Conjugate 13-valent (Scott Paige) 05/20/2016        Health Maintenance   Topic Date Due    DTaP/Tdap/Td vaccine (1 - Tdap) 06/08/1954    Shingles Vaccine (1 of 2) 06/08/1985    Pneumococcal 65+ years Vaccine (2 of 2 - PPSV23) 05/20/2017    Annual Wellness Visit (AWV)  05/29/2019    Flu vaccine (Season Ended) 09/01/2020    Lipid screen  09/16/2020    Potassium monitoring  09/16/2020    Creatinine monitoring  09/16/2020    DEXA (modify frequency per FRAX score)  Completed    Hepatitis A vaccine  Aged Out    Hepatitis B vaccine  Aged Out    Hib vaccine  Aged Out    Meningococcal (ACWY) vaccine  Aged Out     Recommendations for Fatigue Science Due: see orders and patient instructions/AVS.  .   Recommended screening schedule for the next 5-10 years is provided

## 2020-12-09 NOTE — PROGRESS NOTES
On the basis of positive falls risk screening, assessment and plan is as follows: home safety tips provided, pt has been to PT but has Parkinsons and is always a falls risk.

## 2020-12-09 NOTE — PROGRESS NOTES
Hypertension:  Patient is here for follow up chronic hypertension. This is  generally controlled on current medication regimen. Takes meds as directed and tolerates them well. Most recent labs reviewed with patient and are not remarkable. No symptoms from htn standpoint per ROS. Patient is  compliant with lifestyle modifications. Patient does not smoke. Comorbid conditions include hyperlipidemia. Patient's past medical, surgical, social and/or family history reviewed, updated in chart, and are non-contributory (unless otherwise stated). Medications and allergies also reviewed and updated in chart.         Review of Systems:  Constitutional:  No fever, no fatigue, no chills, no headaches, no weight change  Dermatology:  No rash, no mole, no dry or sensitive skin  ENT:  No cough, no sore throat, no sinus pain, no runny nose, no ear pain  Cardiology:  No chest pain, no palpitations, no leg edema, no shortness of breath, no PND  Gastroenterology:  No dysphagia, no abdominal pain, no nausea, no vomiting, no constipation, no diarrhea, no heartburn  Musculoskeletal:  No joint pain, no leg cramps, no back pain, no muscle aches  Respiratory:  No shortness of breath, no orthopnea, no wheezing, no HARRELL, no hemoptysis  Urology:  No blood in the urine, no urinary frequency, no urinary incontinence, no urinary urgency, no nocturia, no dysuria  Vitals:    12/09/20 1119 12/09/20 1122   BP: (!) 144/76 129/72   Pulse: 72    Resp: 16    Temp: 97.2 °F (36.2 °C)    TempSrc: Temporal    SpO2: 99%    Weight: 113 lb 3.2 oz (51.3 kg)    Height: 5' 2\" (1.575 m)        General:  Patient alert and oriented x 3, NAD, pleasant  HEENT:  Atraumatic, normocephalic, PERRLA, EOMI, clear conjunctiva, TMs clear, nose-clear, throat - no erythema  Neck:  Supple, no goiter, no carotid bruits, no lymphadenopathy  Lungs:  CTA B  Heart:  RRR, no murmurs, gallops or rubs  Abdomen:  Soft/nt/nd, + bowel sounds  Extremities:  No clubbing, cyanosis or edema  Skin: unremarkable    Assessment/Plan:      Jasmyne Colon was seen today for hypertension and discuss labs. Diagnoses and all orders for this visit:    At high risk for falls    Essential hypertension  -     Comprehensive Metabolic Panel; Future  -     CBC Auto Differential; Future    Mixed hyperlipidemia  -     Lipid Panel; Future  -     TSH without Reflex; Future    Parkinson's disease ([facility] [address]    Impaired fasting glucose  -     Hemoglobin A1C; Future      As above. Call or go to ED immediately if symptoms worsen or persist.  Return in about 6 months (around 6/9/2021). , or sooner if necessary. Educational materials and/or home exercises printed for patient's review and were included in patient instructions on his/her After Visit Summary and given to patient at the end of visit. Counseled regarding above diagnosis, including possible risks and complications,  especially if left uncontrolled. Counseled regarding the possible side effects, risks, benefits and alternatives to treatment; patient and/or guardian verbalizes understanding, agrees, feels comfortable with and wishes to proceed with above treatment plan. Advised patient to call with any new medication issues, and read all Rx info from pharmacy to assure aware of all possible risks and side effects of medication before taking. Reviewed age and gender appropriate health screening exams and vaccinations. Advised patient regarding importance of keeping up with recommended health maintenance and to schedule as soon as possible if overdue, as this is important in assessing for undiagnosed pathology, especially cancer, as well as protecting against potentially harmful/life threatening disease. Patient and/or guardian verbalizes understanding and agrees with above counseling, assessment and plan. All questions answered.     Brett Hyman MD  12/9/2020    I have personally reviewed and updated the chief complaint, HPI, Past Medical, Family and Social History, as well as the above Review of Systems.

## 2021-01-01 ENCOUNTER — NURSE ONLY (OUTPATIENT)
Dept: FAMILY MEDICINE CLINIC | Age: 86
End: 2021-01-01
Payer: MEDICARE

## 2021-01-01 ENCOUNTER — ANESTHESIA (OUTPATIENT)
Dept: ENDOSCOPY | Age: 86
End: 2021-01-01
Payer: MEDICARE

## 2021-01-01 ENCOUNTER — APPOINTMENT (OUTPATIENT)
Dept: CT IMAGING | Age: 86
DRG: 438 | End: 2021-01-01
Payer: MEDICARE

## 2021-01-01 ENCOUNTER — TELEPHONE (OUTPATIENT)
Dept: FAMILY MEDICINE CLINIC | Age: 86
End: 2021-01-01

## 2021-01-01 ENCOUNTER — APPOINTMENT (OUTPATIENT)
Dept: GENERAL RADIOLOGY | Age: 86
DRG: 438 | End: 2021-01-01
Payer: MEDICARE

## 2021-01-01 ENCOUNTER — HOSPITAL ENCOUNTER (OUTPATIENT)
Dept: ULTRASOUND IMAGING | Age: 86
Discharge: HOME | End: 2021-02-04
Payer: MEDICARE

## 2021-01-01 ENCOUNTER — ANESTHESIA (OUTPATIENT)
Dept: ENDOSCOPY | Age: 86
DRG: 438 | End: 2021-01-01
Payer: MEDICARE

## 2021-01-01 ENCOUNTER — TELEPHONE (OUTPATIENT)
Dept: ADMINISTRATIVE | Age: 86
End: 2021-01-01

## 2021-01-01 ENCOUNTER — OFFICE VISIT (OUTPATIENT)
Dept: FAMILY MEDICINE CLINIC | Age: 86
End: 2021-01-01
Payer: MEDICARE

## 2021-01-01 ENCOUNTER — APPOINTMENT (OUTPATIENT)
Dept: MRI IMAGING | Age: 86
DRG: 438 | End: 2021-01-01
Payer: MEDICARE

## 2021-01-01 ENCOUNTER — OFFICE VISIT (OUTPATIENT)
Dept: HEMATOLOGY | Age: 86
End: 2021-01-01
Payer: MEDICARE

## 2021-01-01 ENCOUNTER — HOSPITAL ENCOUNTER (INPATIENT)
Age: 86
LOS: 4 days | Discharge: HOME | DRG: 438 | End: 2021-01-19
Attending: EMERGENCY MEDICINE | Admitting: INTERNAL MEDICINE
Payer: MEDICARE

## 2021-01-01 ENCOUNTER — ANESTHESIA EVENT (OUTPATIENT)
Dept: ENDOSCOPY | Age: 86
End: 2021-01-01
Payer: MEDICARE

## 2021-01-01 ENCOUNTER — TELEPHONE (OUTPATIENT)
Dept: PALLATIVE CARE | Age: 86
End: 2021-01-01

## 2021-01-01 ENCOUNTER — TELEPHONE (OUTPATIENT)
Dept: SURGERY | Age: 86
End: 2021-01-01

## 2021-01-01 ENCOUNTER — ANESTHESIA EVENT (OUTPATIENT)
Dept: ENDOSCOPY | Age: 86
DRG: 438 | End: 2021-01-01
Payer: MEDICARE

## 2021-01-01 ENCOUNTER — HOSPITAL ENCOUNTER (OUTPATIENT)
Age: 86
Setting detail: OUTPATIENT SURGERY
Discharge: HOME | End: 2021-01-29
Attending: SURGERY | Admitting: SURGERY
Payer: MEDICARE

## 2021-01-01 ENCOUNTER — HOSPITAL ENCOUNTER (OUTPATIENT)
Age: 86
Discharge: HOME | End: 2021-01-27
Payer: MEDICARE

## 2021-01-01 VITALS
OXYGEN SATURATION: 95 % | BODY MASS INDEX: 19.88 KG/M2 | HEART RATE: 68 BPM | RESPIRATION RATE: 19 BRPM | TEMPERATURE: 98 F | HEIGHT: 62 IN | DIASTOLIC BLOOD PRESSURE: 64 MMHG | WEIGHT: 108 LBS | SYSTOLIC BLOOD PRESSURE: 142 MMHG

## 2021-01-01 VITALS
DIASTOLIC BLOOD PRESSURE: 62 MMHG | BODY MASS INDEX: 20.94 KG/M2 | HEART RATE: 76 BPM | OXYGEN SATURATION: 97 % | RESPIRATION RATE: 18 BRPM | SYSTOLIC BLOOD PRESSURE: 154 MMHG | HEIGHT: 62 IN | WEIGHT: 113.8 LBS | TEMPERATURE: 97.3 F

## 2021-01-01 VITALS
DIASTOLIC BLOOD PRESSURE: 50 MMHG | OXYGEN SATURATION: 100 % | SYSTOLIC BLOOD PRESSURE: 130 MMHG | RESPIRATION RATE: 17 BRPM

## 2021-01-01 VITALS
HEART RATE: 79 BPM | TEMPERATURE: 98.1 F | OXYGEN SATURATION: 99 % | SYSTOLIC BLOOD PRESSURE: 136 MMHG | DIASTOLIC BLOOD PRESSURE: 79 MMHG | RESPIRATION RATE: 16 BRPM

## 2021-01-01 VITALS
WEIGHT: 108 LBS | HEART RATE: 85 BPM | SYSTOLIC BLOOD PRESSURE: 147 MMHG | RESPIRATION RATE: 14 BRPM | BODY MASS INDEX: 19.88 KG/M2 | TEMPERATURE: 97.7 F | DIASTOLIC BLOOD PRESSURE: 66 MMHG | HEIGHT: 62 IN | OXYGEN SATURATION: 96 %

## 2021-01-01 VITALS — DIASTOLIC BLOOD PRESSURE: 58 MMHG | SYSTOLIC BLOOD PRESSURE: 116 MMHG | OXYGEN SATURATION: 100 %

## 2021-01-01 VITALS
HEIGHT: 62 IN | SYSTOLIC BLOOD PRESSURE: 130 MMHG | OXYGEN SATURATION: 97 % | RESPIRATION RATE: 16 BRPM | DIASTOLIC BLOOD PRESSURE: 86 MMHG | TEMPERATURE: 97.5 F | BODY MASS INDEX: 22.08 KG/M2 | HEART RATE: 75 BPM | WEIGHT: 120 LBS

## 2021-01-01 DIAGNOSIS — U07.1 COVID-19: ICD-10-CM

## 2021-01-01 DIAGNOSIS — E78.2 MIXED HYPERLIPIDEMIA: ICD-10-CM

## 2021-01-01 DIAGNOSIS — R17 ELEVATED BILIRUBIN: ICD-10-CM

## 2021-01-01 DIAGNOSIS — R73.01 IMPAIRED FASTING GLUCOSE: ICD-10-CM

## 2021-01-01 DIAGNOSIS — R30.0 DYSURIA: ICD-10-CM

## 2021-01-01 DIAGNOSIS — Z51.5 HOSPICE CARE: Primary | ICD-10-CM

## 2021-01-01 DIAGNOSIS — K86.2 CYSTIC MASS OF PANCREAS: Primary | ICD-10-CM

## 2021-01-01 DIAGNOSIS — Z51.5 HOSPICE CARE: ICD-10-CM

## 2021-01-01 DIAGNOSIS — U07.1 COVID-19: Primary | ICD-10-CM

## 2021-01-01 DIAGNOSIS — I10 ESSENTIAL HYPERTENSION: ICD-10-CM

## 2021-01-01 DIAGNOSIS — R19.7 DIARRHEA, UNSPECIFIED TYPE: Primary | ICD-10-CM

## 2021-01-01 DIAGNOSIS — C25.9 ADENOCARCINOMA OF PANCREAS (HCC): Primary | ICD-10-CM

## 2021-01-01 DIAGNOSIS — K86.2 CYSTIC MASS OF PANCREAS: ICD-10-CM

## 2021-01-01 DIAGNOSIS — R10.30 LOWER ABDOMINAL PAIN: Primary | ICD-10-CM

## 2021-01-01 DIAGNOSIS — K86.89 PANCREATIC DUCT OBSTRUCTION: ICD-10-CM

## 2021-01-01 DIAGNOSIS — K21.9 GASTROESOPHAGEAL REFLUX DISEASE, UNSPECIFIED WHETHER ESOPHAGITIS PRESENT: ICD-10-CM

## 2021-01-01 DIAGNOSIS — K86.2 PANCREATIC CYST: Primary | ICD-10-CM

## 2021-01-01 DIAGNOSIS — R60.0 BILATERAL LEG EDEMA: ICD-10-CM

## 2021-01-01 DIAGNOSIS — R10.30 LOWER ABDOMINAL PAIN: ICD-10-CM

## 2021-01-01 DIAGNOSIS — R79.89 ELEVATED LFTS: ICD-10-CM

## 2021-01-01 DIAGNOSIS — C25.9 PANCREATIC ADENOCARCINOMA (HCC): Primary | ICD-10-CM

## 2021-01-01 DIAGNOSIS — R60.0 BILATERAL LEG EDEMA: Primary | ICD-10-CM

## 2021-01-01 LAB
ALBUMIN SERPL-MCNC: 2.8 G/DL (ref 3.5–5.2)
ALBUMIN SERPL-MCNC: 2.8 G/DL (ref 3.5–5.2)
ALBUMIN SERPL-MCNC: 3 G/DL (ref 3.5–5.2)
ALBUMIN SERPL-MCNC: 3.5 G/DL (ref 3.5–5.2)
ALBUMIN SERPL-MCNC: 3.6 G/DL (ref 3.5–5.2)
ALBUMIN SERPL-MCNC: 4.1 G/DL (ref 3.5–5.2)
ALP BLD-CCNC: 702 U/L (ref 35–104)
ALP BLD-CCNC: 719 U/L (ref 35–104)
ALP BLD-CCNC: 727 U/L (ref 35–104)
ALP BLD-CCNC: 731 U/L (ref 35–104)
ALP BLD-CCNC: 822 U/L (ref 35–104)
ALP BLD-CCNC: 832 U/L (ref 35–104)
ALT SERPL-CCNC: 134 U/L (ref 0–32)
ALT SERPL-CCNC: 223 U/L (ref 0–32)
ALT SERPL-CCNC: 43 U/L (ref 0–32)
ALT SERPL-CCNC: 43 U/L (ref 0–32)
ALT SERPL-CCNC: 46 U/L (ref 0–32)
ALT SERPL-CCNC: 77 U/L (ref 0–32)
AMYLASE: 54 U/L (ref 20–100)
AMYLASE: 62 U/L (ref 20–100)
AMYLASE: 63 U/L (ref 20–100)
ANION GAP SERPL CALCULATED.3IONS-SCNC: 10 MMOL/L (ref 7–16)
ANION GAP SERPL CALCULATED.3IONS-SCNC: 15 MMOL/L (ref 7–16)
ANION GAP SERPL CALCULATED.3IONS-SCNC: 6 MMOL/L (ref 7–16)
ANION GAP SERPL CALCULATED.3IONS-SCNC: 8 MMOL/L (ref 7–16)
ANION GAP SERPL CALCULATED.3IONS-SCNC: 9 MMOL/L (ref 7–16)
ANION GAP SERPL CALCULATED.3IONS-SCNC: 9 MMOL/L (ref 7–16)
ANTI-MITOCHONDRIAL AB, IFA: NEGATIVE
APTT: 27.3 SEC (ref 24.5–35.1)
AST SERPL-CCNC: 136 U/L (ref 0–31)
AST SERPL-CCNC: 179 U/L (ref 0–31)
AST SERPL-CCNC: 216 U/L (ref 0–31)
AST SERPL-CCNC: 297 U/L (ref 0–31)
AST SERPL-CCNC: 318 U/L (ref 0–31)
AST SERPL-CCNC: 418 U/L (ref 0–31)
BACTERIA: ABNORMAL /HPF
BASOPHILS ABSOLUTE: 0.03 E9/L (ref 0–0.2)
BASOPHILS ABSOLUTE: 0.04 E9/L (ref 0–0.2)
BASOPHILS ABSOLUTE: 0.05 E9/L (ref 0–0.2)
BASOPHILS ABSOLUTE: 0.05 E9/L (ref 0–0.2)
BASOPHILS RELATIVE PERCENT: 0.4 % (ref 0–2)
BASOPHILS RELATIVE PERCENT: 0.4 % (ref 0–2)
BASOPHILS RELATIVE PERCENT: 0.5 % (ref 0–2)
BASOPHILS RELATIVE PERCENT: 0.5 % (ref 0–2)
BASOPHILS RELATIVE PERCENT: 0.6 % (ref 0–2)
BASOPHILS RELATIVE PERCENT: 0.9 % (ref 0–2)
BILIRUB SERPL-MCNC: 2.9 MG/DL (ref 0–1.2)
BILIRUB SERPL-MCNC: 4.6 MG/DL (ref 0–1.2)
BILIRUB SERPL-MCNC: 4.7 MG/DL (ref 0–1.2)
BILIRUB SERPL-MCNC: 4.9 MG/DL (ref 0–1.2)
BILIRUB SERPL-MCNC: 5.2 MG/DL (ref 0–1.2)
BILIRUB SERPL-MCNC: 5.3 MG/DL (ref 0–1.2)
BILIRUBIN DIRECT: 4.4 MG/DL (ref 0–0.3)
BILIRUBIN URINE: ABNORMAL
BILIRUBIN, INDIRECT: 0.5 MG/DL (ref 0–1)
BLOOD, URINE: NEGATIVE
BUN BLDV-MCNC: 11 MG/DL (ref 8–23)
BUN BLDV-MCNC: 13 MG/DL (ref 8–23)
BUN BLDV-MCNC: 15 MG/DL (ref 8–23)
BUN BLDV-MCNC: 18 MG/DL (ref 8–23)
BUN BLDV-MCNC: 22 MG/DL (ref 8–23)
BUN BLDV-MCNC: 9 MG/DL (ref 8–23)
CA 125: 227.7 U/ML (ref 0–35)
CA 19-9: 1 U/ML (ref 0–37)
CALCIUM SERPL-MCNC: 8.2 MG/DL (ref 8.6–10.2)
CALCIUM SERPL-MCNC: 8.3 MG/DL (ref 8.6–10.2)
CALCIUM SERPL-MCNC: 8.6 MG/DL (ref 8.6–10.2)
CALCIUM SERPL-MCNC: 8.9 MG/DL (ref 8.6–10.2)
CALCIUM SERPL-MCNC: 9.2 MG/DL (ref 8.6–10.2)
CALCIUM SERPL-MCNC: 9.5 MG/DL (ref 8.6–10.2)
CEA: 42.6 NG/ML (ref 0–5.2)
CHLORIDE BLD-SCNC: 101 MMOL/L (ref 98–107)
CHLORIDE BLD-SCNC: 101 MMOL/L (ref 98–107)
CHLORIDE BLD-SCNC: 104 MMOL/L (ref 98–107)
CHLORIDE BLD-SCNC: 104 MMOL/L (ref 98–107)
CHLORIDE BLD-SCNC: 105 MMOL/L (ref 98–107)
CHLORIDE BLD-SCNC: 106 MMOL/L (ref 98–107)
CHOLESTEROL, TOTAL: 142 MG/DL (ref 0–199)
CHROMOGRANIN A: 159 NG/ML (ref 0–103)
CLARITY: CLEAR
CO2: 22 MMOL/L (ref 22–29)
CO2: 22 MMOL/L (ref 22–29)
CO2: 25 MMOL/L (ref 22–29)
CO2: 26 MMOL/L (ref 22–29)
COLOR: YELLOW
CREAT SERPL-MCNC: 0.5 MG/DL (ref 0.5–1)
CREAT SERPL-MCNC: 0.6 MG/DL (ref 0.5–1)
CREAT SERPL-MCNC: 0.8 MG/DL (ref 0.5–1)
EKG ATRIAL RATE: 78 BPM
EKG P AXIS: 78 DEGREES
EKG P-R INTERVAL: 130 MS
EKG Q-T INTERVAL: 402 MS
EKG QRS DURATION: 88 MS
EKG QTC CALCULATION (BAZETT): 458 MS
EKG R AXIS: 82 DEGREES
EKG T AXIS: 58 DEGREES
EKG VENTRICULAR RATE: 78 BPM
EOSINOPHILS ABSOLUTE: 0.3 E9/L (ref 0.05–0.5)
EOSINOPHILS ABSOLUTE: 0.36 E9/L (ref 0.05–0.5)
EOSINOPHILS ABSOLUTE: 0.38 E9/L (ref 0.05–0.5)
EOSINOPHILS ABSOLUTE: 0.41 E9/L (ref 0.05–0.5)
EOSINOPHILS ABSOLUTE: 0.42 E9/L (ref 0.05–0.5)
EOSINOPHILS ABSOLUTE: 0.44 E9/L (ref 0.05–0.5)
EOSINOPHILS RELATIVE PERCENT: 3.9 % (ref 0–6)
EOSINOPHILS RELATIVE PERCENT: 4.2 % (ref 0–6)
EOSINOPHILS RELATIVE PERCENT: 4.9 % (ref 0–6)
EOSINOPHILS RELATIVE PERCENT: 6.5 % (ref 0–6)
EOSINOPHILS RELATIVE PERCENT: 7 % (ref 0–6)
EOSINOPHILS RELATIVE PERCENT: 7.6 % (ref 0–6)
GFR AFRICAN AMERICAN: >60
GFR NON-AFRICAN AMERICAN: >60 ML/MIN/1.73
GLUCOSE BLD-MCNC: 104 MG/DL (ref 74–99)
GLUCOSE BLD-MCNC: 119 MG/DL (ref 74–99)
GLUCOSE BLD-MCNC: 121 MG/DL (ref 74–99)
GLUCOSE BLD-MCNC: 123 MG/DL (ref 74–99)
GLUCOSE BLD-MCNC: 132 MG/DL (ref 74–99)
GLUCOSE BLD-MCNC: 90 MG/DL (ref 74–99)
GLUCOSE URINE: NEGATIVE MG/DL
HBA1C MFR BLD: 6.2 % (ref 4–5.6)
HCT VFR BLD CALC: 28.9 % (ref 34–48)
HCT VFR BLD CALC: 29.7 % (ref 34–48)
HCT VFR BLD CALC: 31.2 % (ref 34–48)
HCT VFR BLD CALC: 33.3 % (ref 34–48)
HCT VFR BLD CALC: 34.1 % (ref 34–48)
HCT VFR BLD CALC: 35.4 % (ref 34–48)
HDLC SERPL-MCNC: 89 MG/DL
HEMOGLOBIN: 10.7 G/DL (ref 11.5–15.5)
HEMOGLOBIN: 10.7 G/DL (ref 11.5–15.5)
HEMOGLOBIN: 11.2 G/DL (ref 11.5–15.5)
HEMOGLOBIN: 9.2 G/DL (ref 11.5–15.5)
HEMOGLOBIN: 9.3 G/DL (ref 11.5–15.5)
HEMOGLOBIN: 9.8 G/DL (ref 11.5–15.5)
IGG 1: 525 MG/DL (ref 240–1118)
IGG 2: 384 MG/DL (ref 124–549)
IGG 3: 44 MG/DL (ref 21–134)
IGG 4: 9 MG/DL (ref 1–123)
IMMATURE GRANULOCYTES #: 0.02 E9/L
IMMATURE GRANULOCYTES #: 0.02 E9/L
IMMATURE GRANULOCYTES #: 0.03 E9/L
IMMATURE GRANULOCYTES #: 0.04 E9/L
IMMATURE GRANULOCYTES %: 0.3 % (ref 0–5)
IMMATURE GRANULOCYTES %: 0.4 % (ref 0–5)
INR BLD: 1
INR BLD: 1
KETONES, URINE: NEGATIVE MG/DL
LACTIC ACID: 0.9 MMOL/L (ref 0.5–2.2)
LDL CHOLESTEROL CALCULATED: 43 MG/DL (ref 0–99)
LEUKOCYTE ESTERASE, URINE: NEGATIVE
LIPASE: 139 U/L (ref 13–60)
LIPASE: 168 U/L (ref 13–60)
LIPASE: 202 U/L (ref 13–60)
LIPASE: 86 U/L (ref 13–60)
LYMPHOCYTES ABSOLUTE: 0.64 E9/L (ref 1.5–4)
LYMPHOCYTES ABSOLUTE: 0.64 E9/L (ref 1.5–4)
LYMPHOCYTES ABSOLUTE: 0.69 E9/L (ref 1.5–4)
LYMPHOCYTES ABSOLUTE: 0.71 E9/L (ref 1.5–4)
LYMPHOCYTES ABSOLUTE: 0.74 E9/L (ref 1.5–4)
LYMPHOCYTES ABSOLUTE: 0.9 E9/L (ref 1.5–4)
LYMPHOCYTES RELATIVE PERCENT: 10 % (ref 20–42)
LYMPHOCYTES RELATIVE PERCENT: 10.2 % (ref 20–42)
LYMPHOCYTES RELATIVE PERCENT: 11.6 % (ref 20–42)
LYMPHOCYTES RELATIVE PERCENT: 15.4 % (ref 20–42)
LYMPHOCYTES RELATIVE PERCENT: 7.6 % (ref 20–42)
LYMPHOCYTES RELATIVE PERCENT: 8.8 % (ref 20–42)
MCH RBC QN AUTO: 29.3 PG (ref 26–35)
MCH RBC QN AUTO: 29.8 PG (ref 26–35)
MCH RBC QN AUTO: 30.2 PG (ref 26–35)
MCH RBC QN AUTO: 30.2 PG (ref 26–35)
MCH RBC QN AUTO: 30.5 PG (ref 26–35)
MCH RBC QN AUTO: 31 PG (ref 26–35)
MCHC RBC AUTO-ENTMCNC: 31 % (ref 32–34.5)
MCHC RBC AUTO-ENTMCNC: 31.4 % (ref 32–34.5)
MCHC RBC AUTO-ENTMCNC: 31.4 % (ref 32–34.5)
MCHC RBC AUTO-ENTMCNC: 31.6 % (ref 32–34.5)
MCHC RBC AUTO-ENTMCNC: 32.1 % (ref 32–34.5)
MCHC RBC AUTO-ENTMCNC: 32.2 % (ref 32–34.5)
MCV RBC AUTO: 93.8 FL (ref 80–99.9)
MCV RBC AUTO: 94.6 FL (ref 80–99.9)
MCV RBC AUTO: 94.9 FL (ref 80–99.9)
MCV RBC AUTO: 95 FL (ref 80–99.9)
MCV RBC AUTO: 96 FL (ref 80–99.9)
MCV RBC AUTO: 98.1 FL (ref 80–99.9)
MONOCYTES ABSOLUTE: 0.62 E9/L (ref 0.1–0.95)
MONOCYTES ABSOLUTE: 0.62 E9/L (ref 0.1–0.95)
MONOCYTES ABSOLUTE: 0.65 E9/L (ref 0.1–0.95)
MONOCYTES ABSOLUTE: 0.65 E9/L (ref 0.1–0.95)
MONOCYTES ABSOLUTE: 0.77 E9/L (ref 0.1–0.95)
MONOCYTES ABSOLUTE: 0.83 E9/L (ref 0.1–0.95)
MONOCYTES RELATIVE PERCENT: 10.5 % (ref 2–12)
MONOCYTES RELATIVE PERCENT: 10.6 % (ref 2–12)
MONOCYTES RELATIVE PERCENT: 11.7 % (ref 2–12)
MONOCYTES RELATIVE PERCENT: 8.5 % (ref 2–12)
MONOCYTES RELATIVE PERCENT: 9.1 % (ref 2–12)
MONOCYTES RELATIVE PERCENT: 9.2 % (ref 2–12)
NEUTROPHILS ABSOLUTE: 3.78 E9/L (ref 1.8–7.3)
NEUTROPHILS ABSOLUTE: 3.86 E9/L (ref 1.8–7.3)
NEUTROPHILS ABSOLUTE: 4.95 E9/L (ref 1.8–7.3)
NEUTROPHILS ABSOLUTE: 5.38 E9/L (ref 1.8–7.3)
NEUTROPHILS ABSOLUTE: 5.47 E9/L (ref 1.8–7.3)
NEUTROPHILS ABSOLUTE: 7.72 E9/L (ref 1.8–7.3)
NEUTROPHILS RELATIVE PERCENT: 65.8 % (ref 43–80)
NEUTROPHILS RELATIVE PERCENT: 68.2 % (ref 43–80)
NEUTROPHILS RELATIVE PERCENT: 73.3 % (ref 43–80)
NEUTROPHILS RELATIVE PERCENT: 75 % (ref 43–80)
NEUTROPHILS RELATIVE PERCENT: 75.7 % (ref 43–80)
NEUTROPHILS RELATIVE PERCENT: 79.1 % (ref 43–80)
NITRITE, URINE: NEGATIVE
ORGANISM: ABNORMAL
PDW BLD-RTO: 13.2 FL (ref 11.5–15)
PDW BLD-RTO: 13.4 FL (ref 11.5–15)
PDW BLD-RTO: 13.5 FL (ref 11.5–15)
PDW BLD-RTO: 13.6 FL (ref 11.5–15)
PH UA: 6 (ref 5–9)
PLATELET # BLD: 157 E9/L (ref 130–450)
PLATELET # BLD: 167 E9/L (ref 130–450)
PLATELET # BLD: 176 E9/L (ref 130–450)
PLATELET # BLD: 194 E9/L (ref 130–450)
PLATELET # BLD: 194 E9/L (ref 130–450)
PLATELET # BLD: 213 E9/L (ref 130–450)
PMV BLD AUTO: 10.2 FL (ref 7–12)
PMV BLD AUTO: 10.3 FL (ref 7–12)
PMV BLD AUTO: 10.3 FL (ref 7–12)
PMV BLD AUTO: 10.4 FL (ref 7–12)
PMV BLD AUTO: 10.5 FL (ref 7–12)
PMV BLD AUTO: 10.5 FL (ref 7–12)
POTASSIUM REFLEX MAGNESIUM: 3.7 MMOL/L (ref 3.5–5)
POTASSIUM SERPL-SCNC: 3.5 MMOL/L (ref 3.5–5)
POTASSIUM SERPL-SCNC: 3.5 MMOL/L (ref 3.5–5)
POTASSIUM SERPL-SCNC: 3.6 MMOL/L (ref 3.5–5)
POTASSIUM SERPL-SCNC: 3.6 MMOL/L (ref 3.5–5)
POTASSIUM SERPL-SCNC: 4.5 MMOL/L (ref 3.5–5)
PROTEIN UA: NEGATIVE MG/DL
PROTHROMBIN TIME: 11.5 SEC (ref 9.3–12.4)
PROTHROMBIN TIME: 11.7 SEC (ref 9.3–12.4)
RBC # BLD: 3.08 E12/L (ref 3.5–5.5)
RBC # BLD: 3.14 E12/L (ref 3.5–5.5)
RBC # BLD: 3.25 E12/L (ref 3.5–5.5)
RBC # BLD: 3.51 E12/L (ref 3.5–5.5)
RBC # BLD: 3.59 E12/L (ref 3.5–5.5)
RBC # BLD: 3.61 E12/L (ref 3.5–5.5)
RBC UA: ABNORMAL /HPF (ref 0–2)
REASON FOR REJECTION: NORMAL
REJECTED TEST: NORMAL
SARS-COV-2: NOT DETECTED
SODIUM BLD-SCNC: 136 MMOL/L (ref 132–146)
SODIUM BLD-SCNC: 136 MMOL/L (ref 132–146)
SODIUM BLD-SCNC: 137 MMOL/L (ref 132–146)
SODIUM BLD-SCNC: 138 MMOL/L (ref 132–146)
SOURCE: NORMAL
SPECIFIC GRAVITY UA: 1.01 (ref 1–1.03)
TOTAL PROTEIN: 5.6 G/DL (ref 6.4–8.3)
TOTAL PROTEIN: 5.6 G/DL (ref 6.4–8.3)
TOTAL PROTEIN: 5.7 G/DL (ref 6.4–8.3)
TOTAL PROTEIN: 6.4 G/DL (ref 6.4–8.3)
TOTAL PROTEIN: 7 G/DL (ref 6.4–8.3)
TOTAL PROTEIN: 7.2 G/DL (ref 6.4–8.3)
TRIGL SERPL-MCNC: 51 MG/DL (ref 0–149)
TROPONIN: 0.03 NG/ML (ref 0–0.03)
TSH SERPL DL<=0.05 MIU/L-ACNC: 3.98 UIU/ML (ref 0.27–4.2)
URINE CULTURE, ROUTINE: ABNORMAL
UROBILINOGEN, URINE: 0.2 E.U./DL
VLDLC SERPL CALC-MCNC: 10 MG/DL
WBC # BLD: 5.5 E9/L (ref 4.5–11.5)
WBC # BLD: 5.9 E9/L (ref 4.5–11.5)
WBC # BLD: 6.8 E9/L (ref 4.5–11.5)
WBC # BLD: 7.1 E9/L (ref 4.5–11.5)
WBC # BLD: 7.3 E9/L (ref 4.5–11.5)
WBC # BLD: 9.8 E9/L (ref 4.5–11.5)
WBC UA: ABNORMAL /HPF (ref 0–5)

## 2021-01-01 PROCEDURE — 1111F DSCHRG MED/CURRENT MED MERGE: CPT | Performed by: TRANSPLANT SURGERY

## 2021-01-01 PROCEDURE — 85025 COMPLETE CBC W/AUTO DIFF WBC: CPT

## 2021-01-01 PROCEDURE — 99232 SBSQ HOSP IP/OBS MODERATE 35: CPT | Performed by: INTERNAL MEDICINE

## 2021-01-01 PROCEDURE — 99283 EMERGENCY DEPT VISIT LOW MDM: CPT

## 2021-01-01 PROCEDURE — 1036F TOBACCO NON-USER: CPT | Performed by: FAMILY MEDICINE

## 2021-01-01 PROCEDURE — 2580000003 HC RX 258: Performed by: INTERNAL MEDICINE

## 2021-01-01 PROCEDURE — 7100000010 HC PHASE II RECOVERY - FIRST 15 MIN: Performed by: SURGERY

## 2021-01-01 PROCEDURE — 80053 COMPREHEN METABOLIC PANEL: CPT

## 2021-01-01 PROCEDURE — 6370000000 HC RX 637 (ALT 250 FOR IP): Performed by: NURSE PRACTITIONER

## 2021-01-01 PROCEDURE — 1090F PRES/ABSN URINE INCON ASSESS: CPT | Performed by: FAMILY MEDICINE

## 2021-01-01 PROCEDURE — 93970 EXTREMITY STUDY: CPT

## 2021-01-01 PROCEDURE — G8399 PT W/DXA RESULTS DOCUMENT: HCPCS | Performed by: FAMILY MEDICINE

## 2021-01-01 PROCEDURE — 2580000003 HC RX 258: Performed by: NURSE ANESTHETIST, CERTIFIED REGISTERED

## 2021-01-01 PROCEDURE — 99223 1ST HOSP IP/OBS HIGH 75: CPT | Performed by: INTERNAL MEDICINE

## 2021-01-01 PROCEDURE — 83690 ASSAY OF LIPASE: CPT

## 2021-01-01 PROCEDURE — 88305 TISSUE EXAM BY PATHOLOGIST: CPT

## 2021-01-01 PROCEDURE — 86304 IMMUNOASSAY TUMOR CA 125: CPT

## 2021-01-01 PROCEDURE — 3700000001 HC ADD 15 MINUTES (ANESTHESIA): Performed by: INTERNAL MEDICINE

## 2021-01-01 PROCEDURE — 85610 PROTHROMBIN TIME: CPT

## 2021-01-01 PROCEDURE — C1769 GUIDE WIRE: HCPCS | Performed by: INTERNAL MEDICINE

## 2021-01-01 PROCEDURE — 6360000002 HC RX W HCPCS: Performed by: NURSE ANESTHETIST, CERTIFIED REGISTERED

## 2021-01-01 PROCEDURE — G8484 FLU IMMUNIZE NO ADMIN: HCPCS | Performed by: FAMILY MEDICINE

## 2021-01-01 PROCEDURE — 99214 OFFICE O/P EST MOD 30 MIN: CPT | Performed by: FAMILY MEDICINE

## 2021-01-01 PROCEDURE — 88173 CYTOPATH EVAL FNA REPORT: CPT

## 2021-01-01 PROCEDURE — 99213 OFFICE O/P EST LOW 20 MIN: CPT | Performed by: FAMILY MEDICINE

## 2021-01-01 PROCEDURE — 2709999900 HC NON-CHARGEABLE SUPPLY: Performed by: INTERNAL MEDICINE

## 2021-01-01 PROCEDURE — 99239 HOSP IP/OBS DSCHRG MGMT >30: CPT | Performed by: INTERNAL MEDICINE

## 2021-01-01 PROCEDURE — 6370000000 HC RX 637 (ALT 250 FOR IP): Performed by: INTERNAL MEDICINE

## 2021-01-01 PROCEDURE — 96374 THER/PROPH/DIAG INJ IV PUSH: CPT

## 2021-01-01 PROCEDURE — 85730 THROMBOPLASTIN TIME PARTIAL: CPT

## 2021-01-01 PROCEDURE — 0F798DZ DILATION OF COMMON BILE DUCT WITH INTRALUMINAL DEVICE, VIA NATURAL OR ARTIFICIAL OPENING ENDOSCOPIC: ICD-10-PCS | Performed by: INTERNAL MEDICINE

## 2021-01-01 PROCEDURE — 1111F DSCHRG MED/CURRENT MED MERGE: CPT | Performed by: FAMILY MEDICINE

## 2021-01-01 PROCEDURE — 36415 COLL VENOUS BLD VENIPUNCTURE: CPT

## 2021-01-01 PROCEDURE — 99203 OFFICE O/P NEW LOW 30 MIN: CPT | Performed by: SURGERY

## 2021-01-01 PROCEDURE — 93005 ELECTROCARDIOGRAM TRACING: CPT | Performed by: NURSE PRACTITIONER

## 2021-01-01 PROCEDURE — 99232 SBSQ HOSP IP/OBS MODERATE 35: CPT | Performed by: TRANSPLANT SURGERY

## 2021-01-01 PROCEDURE — 6360000002 HC RX W HCPCS

## 2021-01-01 PROCEDURE — G8399 PT W/DXA RESULTS DOCUMENT: HCPCS | Performed by: TRANSPLANT SURGERY

## 2021-01-01 PROCEDURE — 82150 ASSAY OF AMYLASE: CPT

## 2021-01-01 PROCEDURE — 71260 CT THORAX DX C+: CPT

## 2021-01-01 PROCEDURE — 3700000000 HC ANESTHESIA ATTENDED CARE: Performed by: INTERNAL MEDICINE

## 2021-01-01 PROCEDURE — 2500000003 HC RX 250 WO HCPCS: Performed by: NURSE ANESTHETIST, CERTIFIED REGISTERED

## 2021-01-01 PROCEDURE — 86301 IMMUNOASSAY TUMOR CA 19-9: CPT

## 2021-01-01 PROCEDURE — 1200000000 HC SEMI PRIVATE

## 2021-01-01 PROCEDURE — 74175 CTA ABDOMEN W/CONTRAST: CPT

## 2021-01-01 PROCEDURE — 2709999900 HC NON-CHARGEABLE SUPPLY: Performed by: SURGERY

## 2021-01-01 PROCEDURE — 71045 X-RAY EXAM CHEST 1 VIEW: CPT

## 2021-01-01 PROCEDURE — 81001 URINALYSIS AUTO W/SCOPE: CPT

## 2021-01-01 PROCEDURE — 3700000001 HC ADD 15 MINUTES (ANESTHESIA): Performed by: SURGERY

## 2021-01-01 PROCEDURE — 7100000011 HC PHASE II RECOVERY - ADDTL 15 MIN: Performed by: INTERNAL MEDICINE

## 2021-01-01 PROCEDURE — G8427 DOCREV CUR MEDS BY ELIG CLIN: HCPCS | Performed by: FAMILY MEDICINE

## 2021-01-01 PROCEDURE — 3700000000 HC ANESTHESIA ATTENDED CARE: Performed by: SURGERY

## 2021-01-01 PROCEDURE — U0003 INFECTIOUS AGENT DETECTION BY NUCLEIC ACID (DNA OR RNA); SEVERE ACUTE RESPIRATORY SYNDROME CORONAVIRUS 2 (SARS-COV-2) (CORONAVIRUS DISEASE [COVID-19]), AMPLIFIED PROBE TECHNIQUE, MAKING USE OF HIGH THROUGHPUT TECHNOLOGIES AS DESCRIBED BY CMS-2020-01-R: HCPCS

## 2021-01-01 PROCEDURE — 80048 BASIC METABOLIC PNL TOTAL CA: CPT

## 2021-01-01 PROCEDURE — 1123F ACP DISCUSS/DSCN MKR DOCD: CPT | Performed by: FAMILY MEDICINE

## 2021-01-01 PROCEDURE — G8427 DOCREV CUR MEDS BY ELIG CLIN: HCPCS | Performed by: TRANSPLANT SURGERY

## 2021-01-01 PROCEDURE — 82378 CARCINOEMBRYONIC ANTIGEN: CPT

## 2021-01-01 PROCEDURE — 6360000004 HC RX CONTRAST MEDICATION: Performed by: RADIOLOGY

## 2021-01-01 PROCEDURE — 82787 IGG 1 2 3 OR 4 EACH: CPT

## 2021-01-01 PROCEDURE — 99222 1ST HOSP IP/OBS MODERATE 55: CPT | Performed by: SURGERY

## 2021-01-01 PROCEDURE — G8420 CALC BMI NORM PARAMETERS: HCPCS | Performed by: FAMILY MEDICINE

## 2021-01-01 PROCEDURE — C1874 STENT, COATED/COV W/DEL SYS: HCPCS | Performed by: INTERNAL MEDICINE

## 2021-01-01 PROCEDURE — C1753 CATH, INTRAVAS ULTRASOUND: HCPCS | Performed by: SURGERY

## 2021-01-01 PROCEDURE — 99221 1ST HOSP IP/OBS SF/LOW 40: CPT | Performed by: SURGERY

## 2021-01-01 PROCEDURE — 86316 IMMUNOASSAY TUMOR OTHER: CPT

## 2021-01-01 PROCEDURE — 3609020800 HC EGD W/EUS FNA: Performed by: SURGERY

## 2021-01-01 PROCEDURE — 93010 ELECTROCARDIOGRAM REPORT: CPT | Performed by: INTERNAL MEDICINE

## 2021-01-01 PROCEDURE — G8484 FLU IMMUNIZE NO ADMIN: HCPCS | Performed by: TRANSPLANT SURGERY

## 2021-01-01 PROCEDURE — 80076 HEPATIC FUNCTION PANEL: CPT

## 2021-01-01 PROCEDURE — 2580000003 HC RX 258: Performed by: NURSE PRACTITIONER

## 2021-01-01 PROCEDURE — 1123F ACP DISCUSS/DSCN MKR DOCD: CPT | Performed by: TRANSPLANT SURGERY

## 2021-01-01 PROCEDURE — 7100000011 HC PHASE II RECOVERY - ADDTL 15 MIN: Performed by: SURGERY

## 2021-01-01 PROCEDURE — 6360000002 HC RX W HCPCS: Performed by: STUDENT IN AN ORGANIZED HEALTH CARE EDUCATION/TRAINING PROGRAM

## 2021-01-01 PROCEDURE — 2720000010 HC SURG SUPPLY STERILE: Performed by: SURGERY

## 2021-01-01 PROCEDURE — G8420 CALC BMI NORM PARAMETERS: HCPCS | Performed by: TRANSPLANT SURGERY

## 2021-01-01 PROCEDURE — 86255 FLUORESCENT ANTIBODY SCREEN: CPT

## 2021-01-01 PROCEDURE — 1036F TOBACCO NON-USER: CPT | Performed by: TRANSPLANT SURGERY

## 2021-01-01 PROCEDURE — 1090F PRES/ABSN URINE INCON ASSESS: CPT | Performed by: TRANSPLANT SURGERY

## 2021-01-01 PROCEDURE — 6360000002 HC RX W HCPCS: Performed by: NURSE PRACTITIONER

## 2021-01-01 PROCEDURE — 74330 X-RAY BILE/PANC ENDOSCOPY: CPT

## 2021-01-01 PROCEDURE — 4040F PNEUMOC VAC/ADMIN/RCVD: CPT | Performed by: FAMILY MEDICINE

## 2021-01-01 PROCEDURE — 83605 ASSAY OF LACTIC ACID: CPT

## 2021-01-01 PROCEDURE — 99212 OFFICE O/P EST SF 10 MIN: CPT | Performed by: TRANSPLANT SURGERY

## 2021-01-01 PROCEDURE — 84484 ASSAY OF TROPONIN QUANT: CPT

## 2021-01-01 PROCEDURE — 4040F PNEUMOC VAC/ADMIN/RCVD: CPT | Performed by: TRANSPLANT SURGERY

## 2021-01-01 PROCEDURE — 2580000003 HC RX 258

## 2021-01-01 PROCEDURE — 3609015100 HC ERCP STENT PLACEMENT BILIARY/PANCREATIC DUCT: Performed by: INTERNAL MEDICINE

## 2021-01-01 PROCEDURE — 99214 OFFICE O/P EST MOD 30 MIN: CPT | Performed by: TRANSPLANT SURGERY

## 2021-01-01 PROCEDURE — 36415 COLL VENOUS BLD VENIPUNCTURE: CPT | Performed by: FAMILY MEDICINE

## 2021-01-01 PROCEDURE — 74181 MRI ABDOMEN W/O CONTRAST: CPT

## 2021-01-01 PROCEDURE — 7100000010 HC PHASE II RECOVERY - FIRST 15 MIN: Performed by: INTERNAL MEDICINE

## 2021-01-01 DEVICE — STENT SYSTEM RMV
Type: IMPLANTABLE DEVICE | Site: BILE DUCT | Status: FUNCTIONAL
Brand: WALLFLEX BILIARY

## 2021-01-01 RX ORDER — PAROXETINE 10 MG/1
10 TABLET, FILM COATED ORAL EVERY MORNING
Status: DISCONTINUED | OUTPATIENT
Start: 2021-01-01 | End: 2021-01-01 | Stop reason: HOSPADM

## 2021-01-01 RX ORDER — MORPHINE SULFATE 100 MG/5ML
5 SOLUTION ORAL
Qty: 30 ML | Refills: 0 | Status: SHIPPED | OUTPATIENT
Start: 2021-01-01 | End: 2021-04-30

## 2021-01-01 RX ORDER — DOXYCYCLINE HYCLATE 100 MG/1
100 CAPSULE ORAL 2 TIMES DAILY
Qty: 20 CAPSULE | Refills: 0 | Status: SHIPPED | OUTPATIENT
Start: 2021-01-01 | End: 2021-01-01

## 2021-01-01 RX ORDER — PANTOPRAZOLE SODIUM 40 MG/1
40 TABLET, DELAYED RELEASE ORAL
Status: DISCONTINUED | OUTPATIENT
Start: 2021-01-01 | End: 2021-01-01 | Stop reason: HOSPADM

## 2021-01-01 RX ORDER — ALBUTEROL SULFATE 2.5 MG/3ML
2.5 SOLUTION RESPIRATORY (INHALATION) EVERY 6 HOURS PRN
Status: DISCONTINUED | OUTPATIENT
Start: 2021-01-01 | End: 2021-01-01 | Stop reason: HOSPADM

## 2021-01-01 RX ORDER — FUROSEMIDE 40 MG/1
40 TABLET ORAL DAILY
Qty: 5 TABLET | Refills: 0 | Status: SHIPPED | OUTPATIENT
Start: 2021-01-01

## 2021-01-01 RX ORDER — LIDOCAINE HYDROCHLORIDE 20 MG/ML
INJECTION, SOLUTION INTRAVENOUS PRN
Status: DISCONTINUED | OUTPATIENT
Start: 2021-01-01 | End: 2021-01-01 | Stop reason: SDUPTHER

## 2021-01-01 RX ORDER — TRAMADOL HYDROCHLORIDE 50 MG/1
50 TABLET ORAL EVERY 6 HOURS PRN
Qty: 10 TABLET | Refills: 0 | Status: SHIPPED | OUTPATIENT
Start: 2021-01-01 | End: 2021-01-01 | Stop reason: SDUPTHER

## 2021-01-01 RX ORDER — SODIUM CHLORIDE 9 MG/ML
INJECTION, SOLUTION INTRAVENOUS CONTINUOUS PRN
Status: DISCONTINUED | OUTPATIENT
Start: 2021-01-01 | End: 2021-01-01 | Stop reason: SDUPTHER

## 2021-01-01 RX ORDER — LORAZEPAM 1 MG/1
1 TABLET ORAL EVERY 6 HOURS PRN
Qty: 5 TABLET | Refills: 0 | Status: SHIPPED | OUTPATIENT
Start: 2021-01-01 | End: 2021-01-01

## 2021-01-01 RX ORDER — TRAMADOL HYDROCHLORIDE 50 MG/1
50 TABLET ORAL EVERY 6 HOURS PRN
Qty: 56 TABLET | Refills: 0 | Status: SHIPPED | OUTPATIENT
Start: 2021-01-01 | End: 2021-01-01

## 2021-01-01 RX ORDER — PROPOFOL 10 MG/ML
INJECTION, EMULSION INTRAVENOUS PRN
Status: DISCONTINUED | OUTPATIENT
Start: 2021-01-01 | End: 2021-01-01 | Stop reason: SDUPTHER

## 2021-01-01 RX ORDER — ROPINIROLE 0.5 MG/1
0.5 TABLET, FILM COATED ORAL NIGHTLY
COMMUNITY
End: 2021-01-01 | Stop reason: SDUPTHER

## 2021-01-01 RX ORDER — ROPINIROLE 0.5 MG/1
0.5 TABLET, FILM COATED ORAL NIGHTLY
Qty: 90 TABLET | Refills: 3 | Status: SHIPPED | OUTPATIENT
Start: 2021-01-01

## 2021-01-01 RX ORDER — SODIUM CHLORIDE 0.9 % (FLUSH) 0.9 %
10 SYRINGE (ML) INJECTION EVERY 12 HOURS SCHEDULED
Status: DISCONTINUED | OUTPATIENT
Start: 2021-01-01 | End: 2021-01-01 | Stop reason: HOSPADM

## 2021-01-01 RX ORDER — SODIUM CHLORIDE, SODIUM LACTATE, POTASSIUM CHLORIDE, CALCIUM CHLORIDE 600; 310; 30; 20 MG/100ML; MG/100ML; MG/100ML; MG/100ML
INJECTION, SOLUTION INTRAVENOUS CONTINUOUS PRN
Status: DISCONTINUED | OUTPATIENT
Start: 2021-01-01 | End: 2021-01-01 | Stop reason: SDUPTHER

## 2021-01-01 RX ORDER — SENNA AND DOCUSATE SODIUM 50; 8.6 MG/1; MG/1
1 TABLET, FILM COATED ORAL 2 TIMES DAILY
Status: DISCONTINUED | OUTPATIENT
Start: 2021-01-01 | End: 2021-01-01 | Stop reason: HOSPADM

## 2021-01-01 RX ORDER — ATORVASTATIN CALCIUM 20 MG/1
20 TABLET, FILM COATED ORAL DAILY
Refills: 3 | Status: DISCONTINUED | OUTPATIENT
Start: 2021-01-01 | End: 2021-01-01 | Stop reason: HOSPADM

## 2021-01-01 RX ORDER — NITROFURANTOIN 25; 75 MG/1; MG/1
100 CAPSULE ORAL 2 TIMES DAILY
Qty: 10 CAPSULE | Refills: 0 | Status: ON HOLD
Start: 2021-01-01 | End: 2021-01-01 | Stop reason: HOSPADM

## 2021-01-01 RX ORDER — POLYETHYLENE GLYCOL 3350 17 G/17G
17 POWDER, FOR SOLUTION ORAL DAILY PRN
Status: DISCONTINUED | OUTPATIENT
Start: 2021-01-01 | End: 2021-01-01 | Stop reason: HOSPADM

## 2021-01-01 RX ORDER — ASPIRIN 81 MG/1
81 TABLET, CHEWABLE ORAL NIGHTLY
Status: DISCONTINUED | OUTPATIENT
Start: 2021-01-01 | End: 2021-01-01 | Stop reason: HOSPADM

## 2021-01-01 RX ORDER — SODIUM CHLORIDE 9 MG/ML
INJECTION, SOLUTION INTRAVENOUS CONTINUOUS
Status: DISCONTINUED | OUTPATIENT
Start: 2021-01-01 | End: 2021-01-01 | Stop reason: HOSPADM

## 2021-01-01 RX ORDER — ONDANSETRON 2 MG/ML
INJECTION INTRAMUSCULAR; INTRAVENOUS PRN
Status: DISCONTINUED | OUTPATIENT
Start: 2021-01-01 | End: 2021-01-01 | Stop reason: SDUPTHER

## 2021-01-01 RX ORDER — SODIUM CHLORIDE, SODIUM LACTATE, POTASSIUM CHLORIDE, AND CALCIUM CHLORIDE .6; .31; .03; .02 G/100ML; G/100ML; G/100ML; G/100ML
1000 INJECTION, SOLUTION INTRAVENOUS ONCE
Status: COMPLETED | OUTPATIENT
Start: 2021-01-01 | End: 2021-01-01

## 2021-01-01 RX ORDER — TRAMADOL HYDROCHLORIDE 100 MG/1
100 TABLET, COATED ORAL 4 TIMES DAILY PRN
Qty: 60 TABLET | Refills: 0 | Status: SHIPPED | OUTPATIENT
Start: 2021-01-01

## 2021-01-01 RX ORDER — FENTANYL CITRATE 50 UG/ML
25 INJECTION, SOLUTION INTRAMUSCULAR; INTRAVENOUS ONCE
Status: COMPLETED | OUTPATIENT
Start: 2021-01-01 | End: 2021-01-01

## 2021-01-01 RX ORDER — CIPROFLOXACIN 2 MG/ML
400 INJECTION, SOLUTION INTRAVENOUS
Status: DISCONTINUED | OUTPATIENT
Start: 2021-01-01 | End: 2021-01-01 | Stop reason: HOSPADM

## 2021-01-01 RX ORDER — ONDANSETRON 4 MG/1
TABLET, ORALLY DISINTEGRATING ORAL
Qty: 30 TABLET | Refills: 0 | Status: SHIPPED | OUTPATIENT
Start: 2021-01-01

## 2021-01-01 RX ORDER — DIPHENOXYLATE HYDROCHLORIDE AND ATROPINE SULFATE 2.5; .025 MG/1; MG/1
1 TABLET ORAL 4 TIMES DAILY PRN
Qty: 40 TABLET | Refills: 0 | Status: SHIPPED | OUTPATIENT
Start: 2021-01-01 | End: 2021-01-01

## 2021-01-01 RX ORDER — TRAMADOL HYDROCHLORIDE 50 MG/1
50 TABLET ORAL EVERY 6 HOURS PRN
Status: DISCONTINUED | OUTPATIENT
Start: 2021-01-01 | End: 2021-01-01 | Stop reason: HOSPADM

## 2021-01-01 RX ORDER — MORPHINE SULFATE 100 MG/5ML
5 SOLUTION ORAL
Qty: 30 ML | Refills: 0 | Status: SHIPPED
Start: 2021-01-01 | End: 2021-01-01 | Stop reason: SDUPTHER

## 2021-01-01 RX ORDER — SODIUM CHLORIDE 0.9 % (FLUSH) 0.9 %
10 SYRINGE (ML) INJECTION PRN
Status: DISCONTINUED | OUTPATIENT
Start: 2021-01-01 | End: 2021-01-01 | Stop reason: HOSPADM

## 2021-01-01 RX ORDER — ALBUTEROL SULFATE 2.5 MG/3ML
2.5 SOLUTION RESPIRATORY (INHALATION) EVERY 6 HOURS PRN
Status: DISCONTINUED | OUTPATIENT
Start: 2021-01-01 | End: 2021-01-01 | Stop reason: SDUPTHER

## 2021-01-01 RX ORDER — SODIUM CHLORIDE, SODIUM LACTATE, POTASSIUM CHLORIDE, CALCIUM CHLORIDE 600; 310; 30; 20 MG/100ML; MG/100ML; MG/100ML; MG/100ML
INJECTION, SOLUTION INTRAVENOUS CONTINUOUS
Status: DISCONTINUED | OUTPATIENT
Start: 2021-01-01 | End: 2021-01-01 | Stop reason: HOSPADM

## 2021-01-01 RX ORDER — PROPOFOL 10 MG/ML
INJECTION, EMULSION INTRAVENOUS CONTINUOUS PRN
Status: DISCONTINUED | OUTPATIENT
Start: 2021-01-01 | End: 2021-01-01 | Stop reason: SDUPTHER

## 2021-01-01 RX ORDER — OMEPRAZOLE 40 MG/1
40 CAPSULE, DELAYED RELEASE ORAL DAILY
Qty: 30 CAPSULE | Refills: 3 | Status: SHIPPED | OUTPATIENT
Start: 2021-01-01

## 2021-01-01 RX ADMIN — Medication 10 ML: at 21:24

## 2021-01-01 RX ADMIN — PAROXETINE 10 MG: 10 TABLET, FILM COATED ORAL at 09:28

## 2021-01-01 RX ADMIN — CARBIDOPA AND LEVODOPA 1 TABLET: 25; 100 TABLET ORAL at 13:57

## 2021-01-01 RX ADMIN — SODIUM CHLORIDE, PRESERVATIVE FREE 10 ML: 5 INJECTION INTRAVENOUS at 12:23

## 2021-01-01 RX ADMIN — DOCUSATE SODIUM 50 MG AND SENNOSIDES 8.6 MG 1 TABLET: 8.6; 5 TABLET, FILM COATED ORAL at 21:40

## 2021-01-01 RX ADMIN — CIPROFLOXACIN 400 MG: 2 INJECTION, SOLUTION INTRAVENOUS at 11:27

## 2021-01-01 RX ADMIN — ASPIRIN 81 MG: 81 TABLET, CHEWABLE ORAL at 22:14

## 2021-01-01 RX ADMIN — INDOMETHACIN 100 MG: 50 SUPPOSITORY RECTAL at 11:27

## 2021-01-01 RX ADMIN — PROPOFOL 140 MG: 10 INJECTION, EMULSION INTRAVENOUS at 10:16

## 2021-01-01 RX ADMIN — TRAMADOL HYDROCHLORIDE 50 MG: 50 TABLET, FILM COATED ORAL at 12:58

## 2021-01-01 RX ADMIN — CARBIDOPA AND LEVODOPA 1 TABLET: 25; 100 TABLET ORAL at 22:27

## 2021-01-01 RX ADMIN — SODIUM CHLORIDE, POTASSIUM CHLORIDE, SODIUM LACTATE AND CALCIUM CHLORIDE: 600; 310; 30; 20 INJECTION, SOLUTION INTRAVENOUS at 13:04

## 2021-01-01 RX ADMIN — FENTANYL CITRATE 25 MCG: 50 INJECTION, SOLUTION INTRAMUSCULAR; INTRAVENOUS at 17:54

## 2021-01-01 RX ADMIN — CARBIDOPA AND LEVODOPA 1 TABLET: 25; 100 TABLET ORAL at 09:08

## 2021-01-01 RX ADMIN — CARBIDOPA AND LEVODOPA 1 TABLET: 25; 100 TABLET ORAL at 09:28

## 2021-01-01 RX ADMIN — Medication 10 ML: at 10:31

## 2021-01-01 RX ADMIN — SODIUM CHLORIDE: 9 INJECTION, SOLUTION INTRAVENOUS at 16:01

## 2021-01-01 RX ADMIN — Medication 10 ML: at 22:28

## 2021-01-01 RX ADMIN — PANTOPRAZOLE SODIUM 40 MG: 40 TABLET, DELAYED RELEASE ORAL at 08:44

## 2021-01-01 RX ADMIN — CARBIDOPA AND LEVODOPA 1 TABLET: 25; 100 TABLET ORAL at 21:40

## 2021-01-01 RX ADMIN — DOCUSATE SODIUM 50 MG AND SENNOSIDES 8.6 MG 1 TABLET: 8.6; 5 TABLET, FILM COATED ORAL at 21:23

## 2021-01-01 RX ADMIN — SODIUM CHLORIDE: 9 INJECTION, SOLUTION INTRAVENOUS at 10:16

## 2021-01-01 RX ADMIN — LIDOCAINE HYDROCHLORIDE 40 MG: 20 INJECTION, SOLUTION INTRAVENOUS at 10:16

## 2021-01-01 RX ADMIN — CARBIDOPA AND LEVODOPA 1 TABLET: 25; 100 TABLET ORAL at 15:05

## 2021-01-01 RX ADMIN — DOCUSATE SODIUM 50 MG AND SENNOSIDES 8.6 MG 1 TABLET: 8.6; 5 TABLET, FILM COATED ORAL at 09:28

## 2021-01-01 RX ADMIN — Medication 10 ML: at 23:35

## 2021-01-01 RX ADMIN — TRAMADOL HYDROCHLORIDE 50 MG: 50 TABLET, FILM COATED ORAL at 23:01

## 2021-01-01 RX ADMIN — DOCUSATE SODIUM 50 MG AND SENNOSIDES 8.6 MG 1 TABLET: 8.6; 5 TABLET, FILM COATED ORAL at 09:08

## 2021-01-01 RX ADMIN — IOPAMIDOL 140 ML: 755 INJECTION, SOLUTION INTRAVENOUS at 10:44

## 2021-01-01 RX ADMIN — SODIUM CHLORIDE, PRESERVATIVE FREE 10 ML: 5 INJECTION INTRAVENOUS at 12:20

## 2021-01-01 RX ADMIN — ONDANSETRON 4 MG: 2 INJECTION INTRAMUSCULAR; INTRAVENOUS at 13:10

## 2021-01-01 RX ADMIN — DOCUSATE SODIUM 50 MG AND SENNOSIDES 8.6 MG 1 TABLET: 8.6; 5 TABLET, FILM COATED ORAL at 08:44

## 2021-01-01 RX ADMIN — CARBIDOPA AND LEVODOPA 1 TABLET: 25; 100 TABLET ORAL at 15:51

## 2021-01-01 RX ADMIN — ATORVASTATIN CALCIUM 20 MG: 20 TABLET, FILM COATED ORAL at 08:44

## 2021-01-01 RX ADMIN — ATORVASTATIN CALCIUM 20 MG: 20 TABLET, FILM COATED ORAL at 09:09

## 2021-01-01 RX ADMIN — CARBIDOPA AND LEVODOPA 1 TABLET: 25; 100 TABLET ORAL at 08:44

## 2021-01-01 RX ADMIN — PROPOFOL 100 MCG/KG/MIN: 10 INJECTION, EMULSION INTRAVENOUS at 13:10

## 2021-01-01 RX ADMIN — PAROXETINE 10 MG: 10 TABLET, FILM COATED ORAL at 08:44

## 2021-01-01 RX ADMIN — CARBIDOPA AND LEVODOPA 1 TABLET: 25; 100 TABLET ORAL at 14:44

## 2021-01-01 RX ADMIN — ATORVASTATIN CALCIUM 20 MG: 20 TABLET, FILM COATED ORAL at 09:28

## 2021-01-01 RX ADMIN — ASPIRIN 81 MG: 81 TABLET, CHEWABLE ORAL at 21:40

## 2021-01-01 RX ADMIN — CARBIDOPA AND LEVODOPA 1 TABLET: 25; 100 TABLET ORAL at 23:01

## 2021-01-01 RX ADMIN — Medication 10 ML: at 08:45

## 2021-01-01 RX ADMIN — CARBIDOPA AND LEVODOPA 1 TABLET: 25; 100 TABLET ORAL at 22:15

## 2021-01-01 RX ADMIN — Medication 10 ML: at 22:17

## 2021-01-01 RX ADMIN — GLUCAGON HYDROCHLORIDE 0.25 MG: KIT at 13:31

## 2021-01-01 RX ADMIN — SODIUM CHLORIDE, POTASSIUM CHLORIDE, SODIUM LACTATE AND CALCIUM CHLORIDE: 600; 310; 30; 20 INJECTION, SOLUTION INTRAVENOUS at 22:27

## 2021-01-01 RX ADMIN — SODIUM CHLORIDE, POTASSIUM CHLORIDE, SODIUM LACTATE AND CALCIUM CHLORIDE 1000 ML: 600; 310; 30; 20 INJECTION, SOLUTION INTRAVENOUS at 10:29

## 2021-01-01 RX ADMIN — PAROXETINE 10 MG: 10 TABLET, FILM COATED ORAL at 12:48

## 2021-01-01 RX ADMIN — ATORVASTATIN CALCIUM 20 MG: 20 TABLET, FILM COATED ORAL at 21:40

## 2021-01-01 RX ADMIN — ATORVASTATIN CALCIUM 20 MG: 20 TABLET, FILM COATED ORAL at 22:27

## 2021-01-01 RX ADMIN — PANTOPRAZOLE SODIUM 40 MG: 40 TABLET, DELAYED RELEASE ORAL at 09:08

## 2021-01-01 RX ADMIN — Medication 10 ML: at 12:29

## 2021-01-01 ASSESSMENT — PAIN SCALES - GENERAL
PAINLEVEL_OUTOF10: 0
PAINLEVEL_OUTOF10: 0
PAINLEVEL_OUTOF10: 6
PAINLEVEL_OUTOF10: 7
PAINLEVEL_OUTOF10: 0
PAINLEVEL_OUTOF10: 0
PAINLEVEL_OUTOF10: 7
PAINLEVEL_OUTOF10: 0

## 2021-01-01 ASSESSMENT — PAIN DESCRIPTION - DESCRIPTORS
DESCRIPTORS: ACHING
DESCRIPTORS: SORE

## 2021-01-01 ASSESSMENT — PAIN DESCRIPTION - LOCATION: LOCATION: ABDOMEN

## 2021-01-01 ASSESSMENT — PULMONARY FUNCTION TESTS
PIF_VALUE: 0
PIF_VALUE: 1
PIF_VALUE: 1
PIF_VALUE: 0
PIF_VALUE: 1
PIF_VALUE: 0
PIF_VALUE: 1
PIF_VALUE: 0

## 2021-01-01 ASSESSMENT — ENCOUNTER SYMPTOMS
SHORTNESS OF BREATH: 0
COUGH: 0
ABDOMINAL PAIN: 1
SHORTNESS OF BREATH: 0
BACK PAIN: 0
DIARRHEA: 0
NAUSEA: 0
VOMITING: 0
BLOOD IN STOOL: 0
PHOTOPHOBIA: 0
EYE DISCHARGE: 0
ABDOMINAL PAIN: 1
PHOTOPHOBIA: 0
EYE PAIN: 0
NAUSEA: 0
CONSTIPATION: 0

## 2021-01-01 ASSESSMENT — PAIN DESCRIPTION - PROGRESSION: CLINICAL_PROGRESSION: GRADUALLY WORSENING

## 2021-01-01 ASSESSMENT — PATIENT HEALTH QUESTIONNAIRE - PHQ9
SUM OF ALL RESPONSES TO PHQ QUESTIONS 1-9: 0
SUM OF ALL RESPONSES TO PHQ QUESTIONS 1-9: 0
1. LITTLE INTEREST OR PLEASURE IN DOING THINGS: 0

## 2021-01-01 ASSESSMENT — PAIN - FUNCTIONAL ASSESSMENT
PAIN_FUNCTIONAL_ASSESSMENT: ACTIVITIES ARE NOT PREVENTED
PAIN_FUNCTIONAL_ASSESSMENT: ACTIVITIES ARE NOT PREVENTED

## 2021-01-01 ASSESSMENT — PAIN DESCRIPTION - PAIN TYPE: TYPE: ACUTE PAIN

## 2021-01-06 NOTE — PROGRESS NOTES
Abdominal pain:  Patient is here today with complaints of lower abdominal pain. This is a/an recent problem. This has been going on for 2-3 weeks. Exacerbating factors include none. Alleviating factors include none. Pain is cramping in nature. 3/10. Pain is not radiating. Associated signs and symptoms include none. Patient does not have a change in appetite. Patient is  drinking well. Recent travel includes none. Patient does not have blood in stool. Patient has had a colonoscopy. Patient does not urinary complaints. She also states she has been experiencing heart burn with burning in her throat. Patient's past medical, surgical, social and/or family history reviewed, updated in chart, and are non-contributory (unless otherwise stated). Medications and allergies, and problem list also reviewed and updated in chart.       Review of Systems:  Constitutional:  No fever, no fatigue, no chills, no headaches, no weight change  Dermatology:  No rash, no mole, no dry or sensitive skin  ENT:  No cough, no sore throat, no sinus pain, no runny nose, no ear pain  Cardiology:  No chest pain, no palpitations, no leg edema, no shortness of breath, no PND  Gastroenterology:  No dysphagia, no abdominal pain, no nausea, no vomiting, no constipation, no diarrhea, no heartburn  Musculoskeletal:  No joint pain, no leg cramps, no back pain, no muscle aches  Respiratory:  No shortness of breath, no orthopnea, no wheezing, no HARRELL, no hemoptysis  Urology:  No blood in the urine, no urinary frequency, no urinary incontinence, no urinary urgency, no nocturia, no dysuria  Vitals:    01/06/21 1429   BP: 136/79   Pulse: 79   Resp: 16   Temp: 98.1 °F (36.7 °C)   TempSrc: Temporal   SpO2: 99%       General:  Patient alert and oriented x 3, NAD, pleasant  HEENT:  Atraumatic, normocephalic, PERRLA, EOMI, clear conjunctiva, TMs clear, nose-clear, throat - no erythema  Neck:  Supple, no goiter, no carotid bruits, no lymphadenopathy  Lungs:  CTA B  Heart:  RRR, no murmurs, gallops or rubs  Abdomen:  Soft/nt/nd, + bowel sounds  Extremities:  No clubbing, cyanosis or edema  Skin: unremarkable    Assessment/Plan:      Ruma was seen today for abdominal pain. Diagnoses and all orders for this visit:    Lower abdominal pain    Gastroesophageal reflux disease, unspecified whether esophagitis present    Other orders  -     omeprazole (PRILOSEC) 40 MG delayed release capsule; Take 1 capsule by mouth daily    pt to drop off urine for u/a tomorrow am.  As above. Call or go to ED immediately if symptoms worsen or persist.  No follow-ups on file. , or sooner if necessary. Educational materials and/or home exercises printed for patient's review and were included in patient instructions on his/her After Visit Summary and given to patient at the end of visit. Counseled regarding above diagnosis, including possible risks and complications,  especially if left uncontrolled. Counseled regarding the possible side effects, risks, benefits and alternatives to treatment; patient and/or guardian verbalizes understanding, agrees, feels comfortable with and wishes to proceed with above treatment plan. Advised patient to call with any new medication issues, and read all Rx info from pharmacy to assure aware of all possible risks and side effects of medication before taking. Reviewed age and gender appropriate health screening exams and vaccinations. Advised patient regarding importance of keeping up with recommended health maintenance and to schedule as soon as possible if overdue, as this is important in assessing for undiagnosed pathology, especially cancer, as well as protecting against potentially harmful/life threatening disease. Patient and/or guardian verbalizes understanding and agrees with above counseling, assessment and plan. All questions answered.     Chio Infante MD  1/7/2021    I have personally reviewed and updated the chief complaint, HPI, Past Medical, Family and Social History, as well as the above Review of Systems.

## 2021-01-12 NOTE — TELEPHONE ENCOUNTER
Appt Information:  N/A    Reason:  Pt called stating that she didn't realize that she had an appt for lab work on 1/7/2021 and would like to wait until the office gets her CT scan results back (had done last night @ [facility]) and she will then discuss if she is needing to get the labs done or if she can wait until her NOV and have them done then. Pt stated if there are any questions she can be reached @ [phone], if not she will call within a week to check on her CT scan results.        PCP:  Jared Beth DO     LOV:   12/9/2020-OV    NOV:   6/2/2021-labs  6/9/2021-OV      Thank You So Much!!!!

## 2021-01-15 PROBLEM — K86.89 PANCREATIC MASS: Status: ACTIVE | Noted: 2021-01-01

## 2021-01-15 NOTE — ED NOTES
FIRST PROVIDER CONTACT ASSESSMENT NOTE      Department of Emergency Medicine   1/15/21  2:28 PM EST    Chief Complaint: Abdominal Pain (x 3 weeks, sent in by princess for obstructive pancreatic cyst ), Nausea, and Constipation (per CT )      History of Present Illness:   Kee Hendrickson is a 80 y.o. female who presents to the ED for mid abdominal pain x3 weeks. She had a CAT scan done on January 11, 2021. It showed a 4 cm cystic mass on the pancreas as well as constipation. She denies any nausea vomiting shortness breath or chest pain. Medical History:  has a past medical history of Asthma, Hyperlipidemia, Hypertension, and Parkinson disease ([address] Surgical History:  has a past surgical history that includes Hysterectomy; Colonoscopy (10/2010); and Upper gastrointestinal endoscopy (2010). Social History:  reports that she has never smoked. She has never used smokeless tobacco. She reports that she does not drink alcohol or use drugs. Family History: family history includes Cancer (age of onset: 76) in her father; Heart Disease in her father; Heart Disease (age of onset: [de-identified]) in her mother.     *ALLERGIES*     Codeine     Physical Exam:      VS:  BP (!) 102/53   Pulse 75   Temp 98.3 °F (36.8 °C)   Resp 16   Ht 5' 2\" (1.575 m)   Wt 108 lb (49 kg)   SpO2 98%   BMI 19.75 kg/m²      Initial Plan of Care:  Initiate Treatment-Testing, Proceed toTreatment Area When Bed Available for ED Attending/MLP to Continue Care    -----------------END OF FIRST PROVIDER CONTACT ASSESSMENT NOTE--------------  Electronically signed by LORENA Boucher CNP   DD: 1/15/21             LORENA Boucher CNP  01/15/21 1429

## 2021-01-15 NOTE — ED PROVIDER NOTES
Diagnosis management comments: Sera Livingston is an 80-year-old female present emergency department with abdominal pain and known pancreatic mass. Patient did have elevated LFTs and alk phos with elevated bilirubin. Patient was given fentanyl for her abdominal pain with improvement of symptoms. Patient was tender in her epigastric and right upper quadrant on exam.  Patient did not have rebound or guarding and abdomen was soft. Patient did request to eat following pain medication. Patient was sent in for admission and further evaluation. Surgery was contacted and will follow patient. Discussed results and plan with patient she is agreeable to admission and improved from time of arrival to ED. Discussed lab work with patient and with patient's . ED Course as of Jaxson 15 2115   Maynor Love Jaxson 15, 2021   1911 Dr. Tiffanie Hunter will follow patient     [SS]   2111 Updated patient's  over the phone    [SS]   2112 EKG: This EKG is signed and interpreted by me. 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 [address] Past Surgical History:  has a past surgical history that includes Hysterectomy; Colonoscopy (10/2010); and Upper gastrointestinal endoscopy (2010). Social History:  reports that she has never smoked. She has never used smokeless tobacco. She reports that she does not drink alcohol or use drugs. Family History: family history includes Cancer (age of onset: 76) in her father; Heart Disease in her father; Heart Disease (age of onset: [de-identified]) in her mother. The patients home medications have been reviewed.     Allergies: Codeine    -------------------------------------------------- RESULTS -------------------------------------------------    LABS:  Results for orders placed or performed during the hospital encounter of 01/15/21   CBC Auto Differential   Result Value Ref Range    WBC 7.1 4.5 - 11.5 E9/L    RBC 3.51 3.50 - 5.50 E12/L    Hemoglobin 10.7 (L) 11.5 - 15.5 g/dL    Hematocrit 33.3 (L) 34.0 - 48.0 %    MCV 94.9 80.0 - 99.9 fL    MCH 30.5 26.0 - 35.0 pg    MCHC 32.1 32.0 - 34.5 %    RDW 13.2 11.5 - 15.0 fL    Platelets 912 728 - 944 E9/L    MPV 10.2 7.0 - 12.0 fL    Neutrophils % 75.7 43.0 - 80.0 %    Immature Granulocytes % 0.4 0.0 - 5.0 %    Lymphocytes % 10.0 (L) 20.0 - 42.0 %    Monocytes % 9.1 2.0 - 12.0 %    Eosinophils % 4.2 0.0 - 6.0 %    Basophils % 0.6 0.0 - 2.0 %    Neutrophils Absolute 5.38 1.80 - 7.30 E9/L    Immature Granulocytes # 0.03 E9/L    Lymphocytes Absolute 0.71 (L) 1.50 - 4.00 E9/L    Monocytes Absolute 0.65 0.10 - 0.95 E9/L    Eosinophils Absolute 0.30 0.05 - 0.50 E9/L    Basophils Absolute 0.04 0.00 - 0.20 E9/L   Lipase   Result Value Ref Range    Lipase 202 (H) 13 - 60 U/L   Troponin   Result Value Ref Range    Troponin 0.03 0.00 - 0.03 ng/mL   Basic Metabolic Panel w/ Reflex to MG   Result Value Ref Range    Sodium 136 132 - 146 mmol/L    Potassium reflex Magnesium 3.7 3.5 - 5.0 mmol/L    Chloride 101 98 - 107 mmol/L    CO2 25 22 - 29 mmol/L    Anion Gap 10 7 - 16 mmol/L    Glucose 119 (H) 74 - 99 mg/dL    BUN 18 8 - 23 mg/dL    CREATININE 0.6 0.5 - 1.0 mg/dL    GFR Non-African American >60 >=60 mL/min/1.73    GFR African American >60     Calcium 9.2 8.6 - 10.2 mg/dL   Hepatic function panel   Result Value Ref Range    Total Protein 7.0 6.4 - 8.3 g/dL    Alb 3.5 3.5 - 5.2 g/dL    Alkaline Phosphatase 832 (H) 35 - 104 U/L     (H) 0 - 32 U/L     (H) 0 - 31 U/L    Total Bilirubin 4.9 (H) 0.0 - 1.2 mg/dL Bilirubin, Direct 4.4 (H) 0.0 - 0.3 mg/dL    Bilirubin, Indirect 0.5 0.0 - 1.0 mg/dL   Lactic Acid, Plasma   Result Value Ref Range    Lactic Acid 0.9 0.5 - 2.2 mmol/L   Urinalysis with Microscopic   Result Value Ref Range    Color, UA Yellow Straw/Yellow    Clarity, UA Clear Clear    Glucose, Ur Negative Negative mg/dL    Bilirubin Urine SMALL (A) Negative    Ketones, Urine Negative Negative mg/dL    Specific Gravity, UA 1.010 1.005 - 1.030    Blood, Urine Negative Negative    pH, UA 6.0 5.0 - 9.0    Protein, UA Negative Negative mg/dL    Urobilinogen, Urine 0.2 <2.0 E.U./dL    Nitrite, Urine Negative Negative    Leukocyte Esterase, Urine Negative Negative    WBC, UA NONE 0 - 5 /HPF    RBC, UA NONE 0 - 2 /HPF    Bacteria, UA NONE SEEN None Seen /HPF   Protime-INR   Result Value Ref Range    Protime 11.7 9.3 - 12.4 sec    INR 1.0    EKG 12 Lead   Result Value Ref Range    Ventricular Rate 78 BPM    Atrial Rate 78 BPM    P-R Interval 130 ms    QRS Duration 88 ms    Q-T Interval 402 ms    QTc Calculation (Bazett) 458 ms    P Axis 78 degrees    R Axis 82 degrees    T Axis 58 degrees       RADIOLOGY:  XR CHEST PORTABLE   Final Result   No acute process. ------------------------- NURSING NOTES AND VITALS REVIEWED ---------------------------  Date / Time Roomed:  1/15/2021  2:51 PM  ED Bed Assignment:  [id number]    The nursing notes within the ED encounter and vital signs as below have been reviewed.      Patient Vitals for the past 24 hrs:   BP Temp Pulse Resp SpO2 Height Weight   01/15/21 2030 (!) 193/90 98 °F (36.7 °C) 81 18 98 %     01/15/21 1427 (!) 102/53 98.3 °F (36.8 °C) 75 16 98 % 5' 2\" (1.575 m) 108 lb (49 kg)       Oxygen Saturation Interpretation: Normal    ------------------------------------------ PROGRESS NOTES ------------------------------------------  Re-evaluation(s):  Time: 8pm  Patients symptoms are improving  Repeat physical examination is improved    Counseling: I have spoken with the a personal history and physicial examination, re-evaluation prior to disposition, multiple bedside re-evaluations and IV medications and discussed todays results, in addition to providing specific details for the plan of care and counseling regarding the diagnosis and prognosis. Their questions are answered at this time and they are agreeable with the plan of admission.    --------------------------------- ADDITIONAL PROVIDER NOTES ---------------------------------  Consultations:  Spoke with Dr. Leslie Edwards. Discussed case. They will admit the patient. This patient's ED course included: a personal history and physicial examination, re-evaluation prior to disposition, multiple bedside re-evaluations and IV medications    This patient has remained hemodynamically stable during their ED course. Diagnosis:  1. Cystic mass of pancreas    2. Pancreatic duct obstruction    3. Elevated LFTs    4. Elevated bilirubin        Disposition:  Patient's disposition: Admit to med/surg floor  Patient's condition is stable.             Miguel Frazier MD  Resident  01/16/21 0304

## 2021-01-15 NOTE — ED NOTES
Bed: R4  Expected date:   Expected time:   Means of arrival:   Comments:  Pradeep Jacobs RN  01/15/21 6568

## 2021-01-16 PROBLEM — K86.2 CYSTIC MASS OF PANCREAS: Status: ACTIVE | Noted: 2021-01-01

## 2021-01-16 NOTE — CONSULTS
General Surgery/ Surgical Endoscopy Consult  Shaan Katz MD, MS    Patient's Name/Date of Birth: Kelly Diaz / 1935    Date: January 16, 2021     Consulting Surgeon: Jovana Field MD    PCP: Abeba James MD     Chief Complaint: jaundice, pancreatic mass    HPI:   Kelly Diaz is a 80 y.o. female who presents for evaluation of abd pain, panc mass. Timing is constant, radiation to midline, alleviated by none and started several days ago and severity is 7/10. Brought in after found to be jaundice in PCP office and CT done showing pancreatic mass. She appears comfortable now. Has been seen by [facility] surgery and GI. Plans for ERCP Monday. Patient comfortable on exam. Seems to be unaware of her dx of suspected CA in her pancreas. States she doesn't remember everything. Denies SOB, chest pain. States had cscope 10yrs ago that was normal.  Denies diarrhea or bloody stools. She admits some weight loss but states it has not really been significant. She does admit lighter stools over the last 3 weeks and darker urine.       Patient Active Problem List   Diagnosis    HTN (hypertension)    Hyperlipidemia    Pain of left lower extremity    Left knee pain    Parkinson's disease ([address]    Pneumonia    Pancreatic mass       Allergies   Allergen Reactions    Codeine Nausea Only       Past Medical History:   Diagnosis Date    Asthma     Hyperlipidemia     Hypertension     Parkinson disease ([address]        Past Surgical History:   Procedure Laterality Date    COLONOSCOPY  10/2010    HYSTERECTOMY      UPPER GASTROINTESTINAL ENDOSCOPY  2010       Social History     Socioeconomic History    Marital status:      Spouse name: Not on file    Number of children: Not on file    Years of education: Not on file    Highest education level: Not on file   Occupational History    Not on file   Social Needs    Financial resource strain: Not hard at all   Hyacinth-Catracho insecurity Assessment:  Yola Suarez is a 80 y.o. female with very large 4.2 cm pancreatic mass appears to be obstructing superior mesenteric vein without evidence of involvement of the SMA or celiac. Jaundice, scleral icterus. Does not appear uncomfortable no signs of gastric outlet obstruction    Patient Active Problem List   Diagnosis    HTN (hypertension)    Hyperlipidemia    Pain of left lower extremity    Left knee pain    Parkinson's disease ([address]    Pneumonia    Pancreatic mass       Plan:  We'll defer to GI for ERCP plan for Monday  Pending their findings and brushings may need repeat endoscopic ultrasound with Dr. Sonia Agee once he returns  Please call if there is any further intervention necessary  Defer further management to hepatobiliary surgery    Time spent reviewing past medical, surgical, social and family history, vitals, nursing assessment and images. Time spent face to face with patient and family counciling and discussing care exceeded 50% of the time of the consult. Additional time spent reviewing images and labs, discussing case with nursing, support staff and other physicians; as well as coordinating care.         Physician Signature: Electronically signed by Dr. Serna Slot  [phone] (p)

## 2021-01-16 NOTE — H&P
[facility] History and Physical      CHIEF COMPLAINT: Abdominal pain    History of Present Illness: 80-year-old female with a history of Parkinson's disease and essential hypertension. For the past few weeks she has complained of a dull periumbilical abdominal pain which she reports is somewhat improved after she eats. Intermittent. No other associated factors. Also noticed a weight loss of about 7 pounds recently but she is not able to give an exact timeline. She followed with her PCP, a CT of the abdomen showed a 4.4 cm cystic lesion in the head of the pancreas leading to biliary obstruction. Presented to the ED for further evaluation. No night sweats. No family history of pancreas disease    Informant(s) for H&P: Patient    REVIEW OF SYSTEMS:  A comprehensive 14 point review of systems was negative except for: what is in the HPI    PMH:  Past Medical History:   Diagnosis Date    Asthma     Hyperlipidemia     Hypertension     Parkinson disease [address] [address]        Surgical History:  Past Surgical History:   Procedure Laterality Date    COLONOSCOPY  10/2010    HYSTERECTOMY      UPPER GASTROINTESTINAL ENDOSCOPY  2010       Medications Prior to Admission:    Prior to Admission medications    Medication Sig Start Date End Date Taking?  Authorizing Provider   nitrofurantoin, macrocrystal-monohydrate, (MACROBID) 100 MG capsule Take 1 capsule by mouth 2 times daily for 5 days 1/11/21 1/16/21  Marco A Chrsitensen DO   omeprazole (PRILOSEC) 40 MG delayed release capsule Take 1 capsule by mouth daily 1/6/21   Laurie Javed MD   simvastatin (ZOCOR) 40 MG tablet TAKE 1 TABLET BY MOUTH AT  NIGHT 10/30/20   Laurie Javed MD   valsartan-hydrochlorothiazide (DIOVAN-HCT) 160-12.5 MG per tablet TAKE 1 TABLET BY MOUTH  DAILY 7/29/20   Pranay Mcclellan MD albuterol sulfate  (90 Base) MCG/ACT inhaler Inhale 2 puffs into the lungs every 6 hours as needed for Wheezing or Shortness of Breath 10/7/19   Jovita Herrera MD   Handicap Placard MISC by Does not apply route Pt cannot walk 200 ft without stopping Duration: Lifetime 7/24/19   Jovita Herrera MD   Multiple Vitamins-Minerals (THERAPEUTIC MULTIVITAMIN-MINERALS) tablet Take 1 tablet by mouth every morning    Historical Provider, MD   PARoxetine (PAXIL) 10 MG tablet Take 10 mg by mouth every morning  2/21/19   Historical Provider, MD   carbidopa-levodopa (SINEMET)  MG per tablet Take 1 tablet by mouth 3 times daily    Historical Provider, MD   aspirin 81 MG tablet Take 81 mg by mouth nightly     Historical Provider, MD   fish oil-omega-3 fatty acids 1000 MG capsule Take 2 g by mouth every morning     Historical Provider, MD   Calcium Carbonate-Vitamin D (CALCIUM + D) 600-200 MG-UNIT TABS Take 1 tablet by mouth every morning     Historical Provider, MD   Coenzyme Q10 (CO Q 10) 100 MG CAPS Take 1 capsule by mouth every morning     Historical Provider, MD       Allergies:    Codeine    Social History:    reports that she has never smoked. She has never used smokeless tobacco. She reports that she does not drink alcohol or use drugs. Family History:   family history includes Cancer (age of onset: 76) in her father; Heart Disease in her father; Heart Disease (age of onset: [de-identified]) in her mother.        PHYSICAL EXAM:  Vitals:  BP (!) 102/53   Pulse 75   Temp 98.3 °F (36.8 °C)   Resp 16   Ht 5' 2\" (1.575 m)   Wt 108 lb (49 kg)   SpO2 98%   BMI 19.75 kg/m²   General Appearance: alert and oriented to person, place and time and in no acute distress  Skin: warm and dry, turgor not diminished  Head: normocephalic and atraumatic  Eyes: pupils equal, round, and reactive to light, extraocular eye movements intact, conjunctivae normal.  No readily appreciable icterus Neck: neck supple and non tender without mass   Pulmonary/Chest: clear to auscultation bilaterally- no wheezes, rales or rhonchi, normal air movement, no respiratory distress  Cardiovascular: normal rate, normal S1 and S2 and no M/R/R  Abdomen: soft, periumbilical tenderness to palpation  Extremities: no cyanosis, no clubbing and no edema  Neurologic: no cranial nerve deficit and speech normal        LABS:  Recent Labs     01/14/21  1200 01/15/21  1642    136   K 4.5 3.7    101   CO2 22 25   BUN 22 18   CREATININE 0.8 0.6   GLUCOSE 121* 119*   CALCIUM 9.5 9.2       Recent Labs     01/14/21  1200 01/15/21  1642   WBC 9.8 7.1   RBC 3.61 3.51   HGB 11.2* 10.7*   HCT 35.4 33.3*   MCV 98.1 94.9   MCH 31.0 30.5   MCHC 31.6* 32.1   RDW 13.2 13.2    194   MPV 10.5 10.2       No results for input(s): POCGLU in the last 72 hours. Radiology:   XR CHEST PORTABLE   Final Result   No acute process. EKG: No acute normality    ASSESSMENT:    Cystic mass of head of pancreas  Biliary obstruction  Parkinson's disease  Essential hypertension    PLAN:  Pancreatic cystic lesion leading to biliary obstruction:  Check CEA and CA 19-9  N. p.o. after midnight  Surgery consulted in ED. Recent scan showed high stool burden. She denies any constipation. We will give laxatives. Parkinson's disease: Continue Sinemet  Essential hypertension: BP borderline. Hold antihypertensives    DVT prophylaxis: Hold pending surgery eval      NOTE: This report was transcribed using voice recognition software. Every effort was made to ensure accuracy; however, inadvertent computerized transcription errors may be present.   Electronically signed by Kamran Munroe MD on 1/15/2021 at 8:14 PM

## 2021-01-16 NOTE — PROGRESS NOTES
The combination of paroxetine and tramadol may cause serotonin syndrome. Please monitor patient for signs/symptoms of serotonin syndrome.   Stu Notice, RPh 1/15/2021 8:23 PM

## 2021-01-16 NOTE — CONSULTS
Surgery History and Physical/Consult Note    CC:    Pancreatic cyst    HPI:  This is a 80 y.o. female with PMH below admitted 1/15/2021 with several weeks of fatigue feeling unwell, vague abdominal discomfort. She had an outpatient CT scan at another facility which reportedly showed a 4 cm pancreatic cyst with biliary ductal dilation and pancreatic ductal dilation. Her symptoms did not improve and she was for to the emergency department for evaluation. She is not a smoker. She is not a drinker. No prior episodes of pancreatitis. No family issues of pancreatic issues including pancreatic cancer. She has noticed she has had increasing jaundice since last 3 weeks as well as lighter stools and darker urine. PMH:  Past Medical History:   Diagnosis Date    Asthma     Hyperlipidemia     Hypertension     Parkinson disease ([facility] [address]        PSH:  Past Surgical History:   Procedure Laterality Date    COLONOSCOPY  10/2010    HYSTERECTOMY      UPPER GASTROINTESTINAL ENDOSCOPY  2010       Family History:  Family History   Problem Relation Age of Onset    Heart Disease Mother [de-identified]    Heart Disease Father     Cancer Father 76        lung       Social History:   reports that she has never smoked. She has never used smokeless tobacco. She reports that she does not drink alcohol or use drugs. Review of Systems:  A 14pt complete review of systems was performed, and all pertinent positives and negatives are listed in the HPI. All other systems are negative. Allergies: Allergies   Allergen Reactions    Codeine Nausea Only       Medications:  Home medications:   Prior to Admission medications    Medication Sig Start Date End Date Taking?  Authorizing Provider   nitrofurantoin, macrocrystal-monohydrate, (MACROBID) 100 MG capsule Take 1 capsule by mouth 2 times daily for 5 days 1/11/21 1/16/21 Yes Leah Christensen, DO omeprazole (PRILOSEC) 40 MG delayed release capsule Take 1 capsule by mouth daily 1/6/21  Yes Isaak Gonzalez MD   simvastatin (ZOCOR) 40 MG tablet TAKE 1 TABLET BY MOUTH AT  NIGHT 10/30/20  Yes Isaak Gonzalez MD   valsartan-hydrochlorothiazide (DIOVAN-HCT) 160-12.5 MG per tablet TAKE 1 TABLET BY MOUTH  DAILY 7/29/20  Yes Yoseph Newton MD   albuterol sulfate  (90 Base) MCG/ACT inhaler Inhale 2 puffs into the lungs every 6 hours as needed for Wheezing or Shortness of Breath 10/7/19  Yes Isaak Gonzalez MD   Multiple Vitamins-Minerals (THERAPEUTIC MULTIVITAMIN-MINERALS) tablet Take 1 tablet by mouth every morning   Yes Historical Provider, MD   PARoxetine (PAXIL) 10 MG tablet Take 10 mg by mouth every morning  2/21/19  Yes Historical Provider, MD   carbidopa-levodopa (SINEMET)  MG per tablet Take 1 tablet by mouth 3 times daily   Yes Historical Provider, MD   aspirin 81 MG tablet Take 81 mg by mouth nightly    Yes Historical Provider, MD   fish oil-omega-3 fatty acids 1000 MG capsule Take 2 g by mouth every morning    Yes Historical Provider, MD   Calcium Carbonate-Vitamin D (CALCIUM + D) 600-200 MG-UNIT TABS Take 1 tablet by mouth every morning    Yes Historical Provider, MD   Coenzyme Q10 (CO Q 10) 100 MG CAPS Take 1 capsule by mouth every morning    Yes Historical Provider, MD   Handicap Placard MISC by Does not apply route Pt cannot walk 200 ft without stopping Duration: Lifetime 7/24/19   Isaak Gonzalez MD       Scheduled medications:   aspirin  81 mg Oral Nightly    carbidopa-levodopa  1 tablet Oral TID    pantoprazole  40 mg Oral QAM AC    PARoxetine  10 mg Oral QAM    atorvastatin  20 mg Oral Daily    sodium chloride flush  10 mL Intravenous 2 times per day    sennosides-docusate sodium  1 tablet Oral BID    bisacodyl  5 mg Oral Once       PRN medications: sodium chloride flush, polyethylene glycol, traMADol, albuterol    Objective:    Physical Examination: Vitals:    01/15/21 1427 01/15/21 2030 01/15/21 2127   BP: (!) 102/53 (!) 193/90 (!) 144/66   Pulse: 75 81 79   Resp: 16 18 16   Temp: 98.3 °F (36.8 °C) 98 °F (36.7 °C) 98.2 °F (36.8 °C)   TempSrc:   Oral   SpO2: 98% 98% 98%   Weight: 108 lb (49 kg)     Height: 5' 2\" (1.575 m)         General - alert, well appearing, and in no distress  HEENT - Normocephalic, Atraumatic. Positive for scleral Icterus  Neck - supple, trachea midline  Respiratory - Breathing comfortably, no respiratory distress  Heart - Regular Rate  Abdomen - soft, nontender without rebound or guarding, nondistended  Neurological - alert, oriented x 3  Psych - affect normal  Musculoskeletal - moves all extremities, no gross deficit  Skin - warm, pink    Labs:    CBC  Recent Labs     01/16/21  0325   WBC 5.9   HGB 10.7*   HCT 34.1        BMP  Recent Labs     01/16/21  0325      K 3.6      CO2 25   BUN 13   CREATININE 0.6   CALCIUM 8.9     Liver Function  Recent Labs     01/15/21  1642 01/16/21  0325   LIPASE 202*  --    BILITOT 4.9* 5.3*   BILIDIR 4.4*  --    * 297*   * 77*   ALKPHOS 832* 822*   PROT 7.0 6.4   LABALBU 3.5 3.6     No results for input(s): LACTATE in the last 72 hours. Recent Labs     01/15/21  1642   INR 1.0       Imaging:    Xr Chest Portable    Result Date: 1/15/2021  EXAMINATION: ONE XRAY VIEW OF THE CHEST 1/15/2021 4:14 pm COMPARISON: CT scan chest 07/10/2020 HISTORY: ORDERING SYSTEM PROVIDED HISTORY: cardiac TECHNOLOGIST PROVIDED HISTORY: Reason for exam:->cardiac FINDINGS: The lungs are without acute focal process. There is no effusion or pneumothorax. The cardiomediastinal silhouette is without acute process. The osseous structures are without acute process. No acute process.             ASSESSMENT & PLAN: I have examined the patient and performed key aspects of physical exam, reviewed the record (including all pertinent and new radiology images and laboratory findings), and discussed the case with the surgical team.  I agree with the assessment and plan with the following additions, corrections, and changes. This is a 80 y.o. female admitted 1/15/2021 with pancreatic mass. Obtain CT triphasic pancreas protocol and CT chest.  CEA and CA-125 elevated, CA 19-9 pending. Obstructive jaundice. We will ask Dr. Dawit Conteh to see for both EUS and ERCP. okay for diet today. Stephaniesaw Springer   1/16/2021   8:57 AM    NOTE: This report, in part or full, may have been transcribed using voice recognition software. Every effort was made to ensure accuracy; however, inadvertent computerized transcription errors may be present. Please excuse any transcriptional grammatical or spelling errors that may have escaped my editorial review.

## 2021-01-16 NOTE — CONSULTS
Torsten Meyers is a 80 y.o. female with significant past medical history of Parkinson disease, HTN, HLD and asthma admitted via ED for abdominal pain. Pt reports she was having lower abdominal pain 4/10, describing it as \"like menstrual pain\" for 3 weeks prior to seeing her PCP. She went to see her PCP who ordered a CT scan of her abdomen showing 4.4 cm cystic lesion in the head of the pancreas leading to biliary dilation and labs showing elevated LFT's. She was sent to ER for further evaluation. She states the pain was intermittent starting in lower abdomen then radiating to right side, nothing making it worse, eating was making it better. Tolerating diet, denies nausea, vomiting or hematemesis. She complains of intermittent constipation, \"it would be hard to go, then I would go and it would be really hard\", denies melena or hematochezia. She said she had a hard stool, yellow then a bout of diarrhea. She reports she takes stool softeners at home with relief. Last BM Thursday. At home patient noticed dark urine over the past couple weeks. Patient denies fever or chills, patient with 7# weight loss over 2 years. She reports intermittent dysphagia with liquids stating she will choke sometimes and this has been persistent since she was diagnosed with Parkinson's disease. Patient denies prior EGD. Colon over 10 years ago, reportedly normal, denies polyps. No known family history of colon, pancreatic or liver cancer. Admission labs Alk phos 731; ; ; bilirubin 4.6; H&H 11.2/35.4; MCHC 31.6; lymph 7.6%; absolute neut 7.72; absolute lymph 0.74. Consultation for ERCP. Currently, pt reports 7/10 lower intermittent abdominal pain. Tolerating diet, denies nausea or vomiting. No BM today. Labs today Alk phos 822; ALT 77; ; bilirubin 5.3; H&H 10.7/34.1; MCHC 31.4; lymph 15.4%; absolute eosin 0.41;  227.7; CEA 42.6.     Past Medical History:        Diagnosis Date    Asthma     Hyperlipidemia  Hypertension     Parkinson disease ([facility] [address]      Past Surgical History:        Procedure Laterality Date    COLONOSCOPY  10/2010    HYSTERECTOMY      UPPER GASTROINTESTINAL ENDOSCOPY  2010     Current Medications:    Current Facility-Administered Medications: aspirin chewable tablet 81 mg, 81 mg, Oral, Nightly  carbidopa-levodopa (SINEMET)  MG per tablet 1 tablet, 1 tablet, Oral, TID  pantoprazole (PROTONIX) tablet 40 mg, 40 mg, Oral, QAM AC  PARoxetine (PAXIL) tablet 10 mg, 10 mg, Oral, QAM  atorvastatin (LIPITOR) tablet 20 mg, 20 mg, Oral, Daily  sodium chloride flush 0.9 % injection 10 mL, 10 mL, Intravenous, 2 times per day  sodium chloride flush 0.9 % injection 10 mL, 10 mL, Intravenous, PRN  lactated ringers infusion, , Intravenous, Continuous  polyethylene glycol (GLYCOLAX) packet 17 g, 17 g, Oral, Daily PRN  sennosides-docusate sodium (SENOKOT-S) 8.6-50 MG tablet 1 tablet, 1 tablet, Oral, BID  bisacodyl (DULCOLAX) EC tablet 5 mg, 5 mg, Oral, Once  traMADol (ULTRAM) tablet 50 mg, 50 mg, Oral, Q6H PRN  albuterol (PROVENTIL) nebulizer solution 2.5 mg, 2.5 mg, Nebulization, Q6H PRN    Allergies:  Codeine    Social History:    Tobacco:  Pt denies  Alcohol:  Pt denies  Illicit Drugs: Pt denies    Family History:   Family History   Problem Relation Age of Onset    Heart Disease Mother [de-identified]    Heart Disease Father     Cancer Father 76        lung       REVIEW OF SYSTEMS:    Aside from what was mentioned in the PMH and HPI, essentially unremarkable, all others negative.     PHYSICAL EXAM:      Vitals:    BP (!) 147/80   Pulse 80   Temp 97.6 °F (36.4 °C) (Temporal)   Resp 18   Ht 5' 2\" (1.575 m)   Wt 108 lb (49 kg)   SpO2 98%   BMI 19.75 kg/m²       CONSTITUTIONAL:  awake, alert, cooperative, no apparent distress, and appears stated age  EYES:  pupils equal, round and reactive to light, sclera icteric and conjunctiva normal  ENT:  normocephalic, oral pharynx with moist mucous membranes  NECK:  supple HEMATOLOGIC/LYMPHATICS:  no cervical lymphadenopathy and no supraclavicular lymphadenopathy  LUNGS:  No increased work of breathing, good air exchange, clear to auscultation bilaterally.   CARDIOVASCULAR:  Normal apical impulse, regular rate and rhythm, no murmur noted; 2+ pulses; 1+ edema  ABDOMEN:  normal bowel sounds, soft, non-distended, tender, no masses palpated, no hepatosplenomegally  MUSCULOSKELETAL:  full range of motion noted  motor strength is 5 out of 5 all extremities bilaterally  NEUROLOGIC:  Mental Status Exam:  Level of Alertness:   awake  Orientation:   person, place, time  Motor Exam:  Motor exam is symmetrical 5 out of 5 all extremities bilaterally  SKIN:  jaundice skin color, texture, turgor    DATA:    CBC with Differential:    Lab Results   Component Value Date    WBC 5.9 01/16/2021    RBC 3.59 01/16/2021    HGB 10.7 01/16/2021    HCT 34.1 01/16/2021     01/16/2021    MCV 95.0 01/16/2021    MCH 29.8 01/16/2021    MCHC 31.4 01/16/2021    RDW 13.2 01/16/2021    SEGSPCT 66 10/23/2013    LYMPHOPCT 15.4 01/16/2021    MONOPCT 10.6 01/16/2021    BASOPCT 0.9 01/16/2021    MONOSABS 0.62 01/16/2021    LYMPHSABS 0.90 01/16/2021    EOSABS 0.41 01/16/2021    BASOSABS 0.05 01/16/2021     CMP:    Lab Results   Component Value Date     01/16/2021    K 3.6 01/16/2021    K 3.7 01/15/2021     01/16/2021    CO2 25 01/16/2021    BUN 13 01/16/2021    CREATININE 0.6 01/16/2021    GFRAA >60 01/16/2021    LABGLOM >60 01/16/2021    GLUCOSE 104 01/16/2021    GLUCOSE 82 04/23/2012    PROT 6.4 01/16/2021    LABALBU 3.6 01/16/2021    LABALBU 4.5 04/23/2012    CALCIUM 8.9 01/16/2021    BILITOT 5.3 01/16/2021    ALKPHOS 822 01/16/2021     01/16/2021    ALT 77 01/16/2021     Hepatic Function Panel:    Lab Results   Component Value Date    ALKPHOS 822 01/16/2021    ALT 77 01/16/2021     01/16/2021    PROT 6.4 01/16/2021    BILITOT 5.3 01/16/2021    BILIDIR 4.4 01/15/2021 IBILI 0.5 01/15/2021    LABALBU 3.6 01/16/2021    LABALBU 4.5 04/23/2012     PT/INR:    Lab Results   Component Value Date    PROTIME 11.7 01/15/2021    INR 1.0 01/15/2021     PTT:  No results found for: APTT, PTT[APTT}  Last 3 Troponin:    Lab Results   Component Value Date    TROPONINI 0.03 01/15/2021    TROPONINI <0.01 07/16/2019     TSH:    Lab Results   Component Value Date    TSH 3.980 01/14/2021     VITAMIN B12:   Lab Results   Component Value Date    [id number] >2000 12/02/2020     FOLATE:    Lab Results   Component Value Date    FOLATE >20.0 12/02/2020     I  No components found for: CHLPL    Lab Results   Component Value Date    TRIG 51 01/14/2021    TRIG 66 12/02/2020    TRIG 70 06/17/2020       Lab Results   Component Value Date    HDL 89 01/14/2021    HDL 77 12/02/2020    HDL 87 06/17/2020       Lab Results   Component Value Date    LDLCALC 43 01/14/2021    LDLCALC 64 12/02/2020    LDLCALC 63 06/17/2020       Lab Results   Component Value Date    LABVLDL 10 01/14/2021    LABVLDL 13 12/02/2020    LABVLDL 14 06/17/2020        Xr Chest Portable    Result Date: 1/15/2021  EXAMINATION: ONE XRAY VIEW OF THE CHEST 1/15/2021 4:14 pm COMPARISON: CT scan chest 07/10/2020 HISTORY: ORDERING SYSTEM PROVIDED HISTORY: cardiac TECHNOLOGIST PROVIDED HISTORY: Reason for exam:->cardiac FINDINGS: The lungs are without acute focal process. There is no effusion or pneumothorax. The cardiomediastinal silhouette is without acute process. The osseous structures are without acute process. No acute process.       IMPRESSION:  · Biliary dilation with 4.4 cm pancreatic cystic lesion  · Hyperbilirubinemia  · Transaminitis  · Anemia  · Constipation  · Parkinson disease  · Dysphagia    RECOMMENDATIONS:    · CTA triphasic pancreas results pending  · MRI/MRCP as ordered  · Plan for ERCP Monday 1/18/21  · Monitor CBC, CMP, lipase and amylase daily  · CEA 42.6  ·  227.7  · CA 19-9 pending  · AMA, IgG's ordered · Medical management per PCP  · Supportive Care  · Continue to monitor    Note: This report was completed utilizing computer voice recognition software. Every effort has been made to ensure accuracy, however; inadvertent computerized transcription errors may be present. Thank you very much for your consultation. We will follow closely with you.     Discussed with Dr. Maxine Giraldo developed by LORENA Gupta, NP-C 1/16/2021 11:57 AM for Dr. Karl Mata

## 2021-01-16 NOTE — PROGRESS NOTES
[facility] Hospital[facility]   Progress Note    Admitting Date and Time: 1/15/2021  2:51 PM  Admit Dx: Pancreatic mass [K86.89]     Seen for follow-up on multiple problems as listed below    Subjective:  Continues with upper abdominal pain, at times radiates around, associate with nausea, no vomiting or diarrhea. Discussed with nursing. ROS: denies fever, chills, cp, sob, HA unless stated above.      aspirin  81 mg Oral Nightly    carbidopa-levodopa  1 tablet Oral TID    pantoprazole  40 mg Oral QAM AC    PARoxetine  10 mg Oral QAM    atorvastatin  20 mg Oral Daily    sodium chloride flush  10 mL Intravenous 2 times per day    sennosides-docusate sodium  1 tablet Oral BID    bisacodyl  5 mg Oral Once         sodium chloride flush, 10 mL, PRN      polyethylene glycol, 17 g, Daily PRN      traMADol, 50 mg, Q6H PRN      albuterol, 2.5 mg, Q6H PRN         Objective:    BP (!) 144/66   Pulse 79   Temp 98.2 °F (36.8 °C) (Oral)   Resp 16   Ht 5' 2\" (1.575 m)   Wt 108 lb (49 kg)   SpO2 98%   BMI 19.75 kg/m²   General Appearance: alert and oriented to person, place and time, underweight, in no acute distress  Skin: warm and dry  Head: normocephalic and atraumatic  Eyes: pupils equal, round, and reactive to light, extraocular eye movements intact, conjunctivae normal  Neck: supple and non-tender without mass  Pulmonary/Chest: clear to auscultation bilaterally  Cardiovascular: normal rate, regular rhythm, normal S1 and S2  Abdomen: soft,mild diffuse upper abdominal tenderess, non-distended, normal bowel sounds, no masses or organomegaly  Extremities: no cyanosis, clubbing   Musculoskeletal: normal range of motion  Neurologic:  no cranial nerve deficit,speech normal      Recent Labs     01/14/21  1200 01/15/21  1642 01/16/21  0325    136 138   K 4.5 3.7 3.6    101 104   CO2 22 25 25   BUN 22 18 13   CREATININE 0.8 0.6 0.6   GLUCOSE 121* 119* 104*   CALCIUM 9.5 9.2 8.9 Recent Labs     01/14/21  1200 01/15/21  1642 01/16/21  0325   WBC 9.8 7.1 5.9   RBC 3.61 3.51 3.59   HGB 11.2* 10.7* 10.7*   HCT 35.4 33.3* 34.1   MCV 98.1 94.9 95.0   MCH 31.0 30.5 29.8   MCHC 31.6* 32.1 31.4*   RDW 13.2 13.2 13.2    194 194   MPV 10.5 10.2 10.3       Labs and images reviewed    Radiology:   XR CHEST PORTABLE   Final Result   No acute process. CTA TRIPHASIC PANCREAS    (Results Pending)   CT CHEST W CONTRAST    (Results Pending)       Assessment:    Active Problems:    Pancreatic mass  Resolved Problems:    * No resolved hospital problems. *      Plan:  Pancreatic mass/cystic lesion leading to biliary obstruction & elevated LFTs /jaundice - Surgery is consulted and following. Scheduled for CT triphasic pancreas protocol and CT chest.  Has elevated CEA and Ca1 25.  CA 19/9 pending. As per surg Dr Bella Baron to see her for EUS + ERCP    Constipation-patient denies but CT with high stool burden, on laxatives as needed. Parkinson's disease-on Sinemet  Hypertension-BP borderline, home meds on hold. Monitor  On SCDs for DVT prophylaxis, pending surgical evaluation.   Full code        Electronically signed by Sydney Fulton MD on 1/16/2021 at 8:07 AM

## 2021-01-17 NOTE — PROGRESS NOTES
[facility] Hospitalist   Progress Note    Admitting Date and Time: 1/15/2021  2:51 PM  Admit Dx: Pancreatic mass [K86.89]     Seen for follow-up on multiple problems as listed below    Subjective:  Continues with nausea , upper abdominal pain and poor po intake. Uneventful night otherwise. ROS: denies fever, chills, cp, sob, HA unless stated above.      aspirin  81 mg Oral Nightly    carbidopa-levodopa  1 tablet Oral TID    pantoprazole  40 mg Oral QAM AC    PARoxetine  10 mg Oral QAM    atorvastatin  20 mg Oral Daily    sodium chloride flush  10 mL Intravenous 2 times per day    sennosides-docusate sodium  1 tablet Oral BID    bisacodyl  5 mg Oral Once         sodium chloride flush, 10 mL, PRN      polyethylene glycol, 17 g, Daily PRN      traMADol, 50 mg, Q6H PRN      albuterol, 2.5 mg, Q6H PRN         Objective:    BP (!) 118/59   Pulse 67   Temp 98.1 °F (36.7 °C) (Oral)   Resp 16   Ht 5' 2\" (1.575 m)   Wt 108 lb (49 kg)   SpO2 95%   BMI 19.75 kg/m²   General Appearance: alert and oriented to person, place and time, underweight, in no acute distress  Skin: warm and dry  Head: normocephalic and atraumatic  Eyes: pupils equal, round, and reactive to light, extraocular eye movements intact, conjunctivae normal  Neck: supple and non-tender without mass  Pulmonary/Chest: clear to auscultation bilaterally  Cardiovascular: normal rate, regular rhythm, normal S1 and S2  Abdomen: soft,mild diffuse upper abdominal tenderess, non-distended, normal bowel sounds, no masses or organomegaly  Extremities: no cyanosis, clubbing   Musculoskeletal: normal range of motion  Neurologic:  no cranial nerve deficit,speech normal      Recent Labs     01/14/21  1200 01/15/21  1642 01/16/21  0325    136 138   K 4.5 3.7 3.6    101 104   CO2 22 25 25   BUN 22 18 13   CREATININE 0.8 0.6 0.6   GLUCOSE 121* 119* 104*   CALCIUM 9.5 9.2 8.9       Recent Labs     01/15/21  1642 01/16/21 0325 01/17/21  0655   WBC 7.1 5.9 5.5   RBC 3.51 3.59 3.08*   HGB 10.7* 10.7* 9.3*   HCT 33.3* 34.1 28.9*   MCV 94.9 95.0 93.8   MCH 30.5 29.8 30.2   MCHC 32.1 31.4* 32.2   RDW 13.2 13.2 13.4    194 157   MPV 10.2 10.3 10.5       Labs and images reviewed    Radiology:   MRI ABDOMEN WO CONTRAST MRCP   Final Result   1. Marked dilatation of the proximal to mid extrahepatic bile duct measuring   up to 1.7 cm with associated moderate intrahepatic ductal dilatation. An   abrupt transition is seen approximately 1.2 cm proximal to the ampulla. The   distal common bile duct is normal caliber and no definite intraductal filling   defect seen. These findings are new from chest CTs in 2019 and could be due   to a biliary stricture. Recommend ERCP for further evaluation. 2. Cystic foci are seen within the pancreatic head. In addition, there may   be subtle peripancreatic inflammation better seen on CT. When correlated   with elevated lipase, these findings suggest acute pancreatitis with acute   peripancreatic fluid collections (APFCs). Lack of intravenous contrast   precludes definitive evaluation for underlying mass. The pancreatic duct is   mildly dilated and irregular, also suggesting chronic pancreatitis. The   pancreatic head findings can be further evaluated with endoscopic ultrasound. Otherwise a follow-up pancreatic CT or MRI is recommended in 1 month to   evaluate for resolution of these changes and determine whether there is an   underlying pancreatic head mass. The findings were sent to the Radiology Results [address] at 1:45   pm on 1/16/2021 to be communicated to a licensed caregiver. CTA TRIPHASIC PANCREAS   Final Result   1. Large complex 4.2 cm mass involving the head of the pancreas associated   with biliary obstruction.   There is marked intra and extrahepatic biliary   dilatation and dilatation of the gallbladder as well as dilatation of the pancreatic duct. There is also evidence of obstruction of the superior   mesenteric vein. 2. Minimal amount of pericholecystic fluid that is likely related to small   amount of perihepatic ascites. Clinical correlation is suggested. 3. Marked thickening and wall edema involving the distal stomach most likely   related to invasion by the pancreatic tumor. 4. 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 About 70% stenosis at   the ALEXI origin. There is no evidence of tumor encasing the celiac or SMA. CT CHEST W CONTRAST   Final Result      XR CHEST PORTABLE   Final Result   No acute process. Assessment:    Active Problems:    Cystic mass of pancreas  Resolved Problems:    * No resolved hospital problems. *      Plan:  Pancreatic mass/cystic lesion leading to biliary obstruction & elevated LFTs /jaundice -GI &  Surgery is consulted and following. Has elevated CEA and Ca1 25.  CA 19/9 pending . MRI /CT reviewed  S/o biliary stricture and pancreatic cystic lesion . She is scheduled for ERCP this Monday. As per surg depending on findings of ERCP she might need to follow-up with Dr. Moon Fierro once he returns . Constipation-patient denies but CT with high stool burden, on laxatives as needed. Parkinson's disease-on Sinemet  Hypertension-BP borderline, home meds on hold. Monitor  On SCDs for DVT prophylaxis, pending surgical evaluation.   Full code        Electronically signed by Jose Rafael Lopes MD on 1/17/2021 at 7:58 AM

## 2021-01-17 NOTE — PROGRESS NOTES
PROGRESS NOTE    Patient Presents with/Seen in Consultation For      Reason for Consult: ERCP  CHIEF COMPLAINT:  Abdominal pain      Subjective:     Patient seen sitting up in chair in no apparent distress. Bowel movement this morning described as soft and brown/yellow. Tolerating diet. Denies abdominal pain, nausea, vomiting, melena, hematochezia or hematemesis. Plan of care discussed with patient and daughter. Verbalized understanding. Review of Systems  Aside from what was mentioned in the PMH and HPI, essentially unremarkable, all others negative. Objective:     BP (!) 124/59   Pulse 69   Temp 98.1 °F (36.7 °C) (Oral)   Resp 16   Ht 5' 2\" (1.575 m)   Wt 108 lb (49 kg)   SpO2 98%   BMI 19.75 kg/m²     General appearance: alert, awake,   Eyes: conjunctiva normal, sclera icteric. PERRL.   Lungs: clear to auscultation bilaterally  Heart: regular rate and rhythm, no murmur, 2+ pulses; 1+ edema to BLE  Abdomen: soft, non-tender; bowel sounds normal; no masses,  no organomegaly  Extremities: extremities 1+ edema  Pulses: 2+ and symmetric  Skin: Skin color jaundice, texture, turgor normal.   Neurologic: Grossly normal        [START ON 1/18/2021] indomethacin (INDOCIN) 50 MG suppository 100 mg, See Admin Instructions      [START ON 1/18/2021] ciprofloxacin (CIPRO) IVPB 400 mg, 60 Min Pre-Op      [START ON 1/18/2021] lactated ringers bolus, Once      aspirin chewable tablet 81 mg, Nightly      carbidopa-levodopa (SINEMET)  MG per tablet 1 tablet, TID      pantoprazole (PROTONIX) tablet 40 mg, QAM AC      PARoxetine (PAXIL) tablet 10 mg, QAM      atorvastatin (LIPITOR) tablet 20 mg, Daily      sodium chloride flush 0.9 % injection 10 mL, 2 times per day      sodium chloride flush 0.9 % injection 10 mL, PRN      lactated ringers infusion, Continuous      polyethylene glycol (GLYCOLAX) packet 17 g, Daily PRN      sennosides-docusate sodium (SENOKOT-S) 8.6-50 MG tablet 1 tablet, BID   bisacodyl (DULCOLAX) EC tablet 5 mg, Once      traMADol (ULTRAM) tablet 50 mg, Q6H PRN      albuterol (PROVENTIL) nebulizer solution 2.5 mg, Q6H PRN         Data Review  CBC:   Lab Results   Component Value Date    WBC 5.5 01/17/2021    RBC 3.08 01/17/2021    HGB 9.3 01/17/2021    HCT 28.9 01/17/2021    MCV 93.8 01/17/2021    MCH 30.2 01/17/2021    MCHC 32.2 01/17/2021    RDW 13.4 01/17/2021     01/17/2021    MPV 10.5 01/17/2021     CMP:    Lab Results   Component Value Date     01/17/2021    K 3.5 01/17/2021    K 3.7 01/15/2021     01/17/2021    CO2 25 01/17/2021    BUN 15 01/17/2021    CREATININE 0.6 01/17/2021    GFRAA >60 01/17/2021    LABGLOM >60 01/17/2021    GLUCOSE 123 01/17/2021    GLUCOSE 82 04/23/2012    PROT 5.7 01/17/2021    LABALBU 2.8 01/17/2021    LABALBU 4.5 04/23/2012    CALCIUM 8.6 01/17/2021    BILITOT 4.7 01/17/2021    ALKPHOS 702 01/17/2021     01/17/2021    ALT 43 01/17/2021     Hepatic Function Panel:    Lab Results   Component Value Date    ALKPHOS 702 01/17/2021    ALT 43 01/17/2021     01/17/2021    PROT 5.7 01/17/2021    BILITOT 4.7 01/17/2021    BILIDIR 4.4 01/15/2021    IBILI 0.5 01/15/2021    LABALBU 2.8 01/17/2021    LABALBU 4.5 04/23/2012     No components found for: CHLPL  Lab Results   Component Value Date    TRIG 51 01/14/2021    TRIG 66 12/02/2020    TRIG 70 06/17/2020     Lab Results   Component Value Date    HDL 89 01/14/2021    HDL 77 12/02/2020    HDL 87 06/17/2020     Lab Results   Component Value Date    LDLCALC 43 01/14/2021    LDLCALC 64 12/02/2020    LDLCALC 63 06/17/2020     Lab Results   Component Value Date    LABVLDL 10 01/14/2021    LABVLDL 13 12/02/2020    LABVLDL 14 06/17/2020      PT/INR:    Lab Results   Component Value Date    PROTIME 11.5 01/17/2021    INR 1.0 01/17/2021     IRON:  No results found for: IRON  Iron Saturation:  No components found for: PERCENTFE  FERRITIN:  No results found for: FERRITIN      Assessment:

## 2021-01-18 NOTE — PROGRESS NOTES
Report called to floor RN  Patient alert but drowsy.   Patient without complaints of pain and tolerating sips of water  VSS

## 2021-01-18 NOTE — PROGRESS NOTES
For ERCP today  Chart and labs reviewed  All additional questions and concerns addressed  Okay to proceed    Elizabeth Mulligan 1/18/2021 0800AM

## 2021-01-18 NOTE — CONSULTS
Department of Medicine  Division of Hematology/Oncology  Attending Consult Note      Reason for Consult:  Pancreatic cystic mass, biliary obstruction  Requesting Physician:  Margaret Cai     CHIEF COMPLAINT:  Abdominal pain x3 weeks    History Obtained From:  Patient and EMR    HISTORY OF PRESENT ILLNESS:      The patient is a 80 y.o. female with significant past medical history of Asthma, HTN, Hyperlipidemia, Parkinson Disease, tubular adenoma 2010 who presents with abdominal pain x 3 weeks. She had a recent [facility] showing a 4cm pancreatic mass obstruction. PCP sent patient in to be evaluated. Labs on admission show WBC 7.1, hemoglobin 10.7, hematocrit 33.3, platelets 159, lipase 202, alkaline phos 832, , , bilirubin 4.9, direct bilirubin 4.4  CXR was negative. CTA triphasic pancreas showed a large complex 4.2 cm mass involving the head of the pancreas associated with biliary obstruction. There is a marked intra and extrahepatic biliary dilation and dilation of the gallbladder as well as dilation of the pancreatic duct. Minimal amount of misty-Haydee cystic fluid that could be likely related to small amount of perihepatic ascites. Marked thickening and wall edema involving the distal stomach most likely related to invasion by the pancreatic tumor. 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 CT of the chest showed no mediastinal, hilar axillary lymphadenopathy. There is a tiny 3 mm nodule density along the right anterior lateral aspect of the distal trachea with suggestive tiny calcification along the wall. MRI of the abdomen showed marked dilation of the proximal to mid extrahepatic bile duct measuring up to 1.7 cm with associated moderate intrahepatic ductal dilation. Cystic foci are seen within the pancreatic head. CEA=42.6, - 227.7. CA-19 normal. Patient is scheduled for an ERCP-biliary and pancreatic today and stent placement. Patient was off the floor for exam. Will try again later.      Past Medical History:        Diagnosis Date    Asthma     Hyperlipidemia     Hypertension     Parkinson disease ([facility] [address]        Past Surgical History:        Procedure Laterality Date    COLONOSCOPY  10/2010    HYSTERECTOMY      UPPER GASTROINTESTINAL ENDOSCOPY  2010       Current Medications:    Current Facility-Administered Medications: indomethacin (INDOCIN) 50 MG suppository 100 mg, 100 mg, Rectal, See Admin Instructions  ciprofloxacin (CIPRO) IVPB 400 mg, 400 mg, Intravenous, 60 Min Pre-Op  lactated ringers bolus, 1,000 mL, Intravenous, Once  aspirin chewable tablet 81 mg, 81 mg, Oral, Nightly  carbidopa-levodopa (SINEMET)  MG per tablet 1 tablet, 1 tablet, Oral, TID  pantoprazole (PROTONIX) tablet 40 mg, 40 mg, Oral, QAM AC  PARoxetine (PAXIL) tablet 10 mg, 10 mg, Oral, QAM  atorvastatin (LIPITOR) tablet 20 mg, 20 mg, Oral, Daily  sodium chloride flush 0.9 % injection 10 mL, 10 mL, Intravenous, 2 times per day  sodium chloride flush 0.9 % injection 10 mL, 10 mL, Intravenous, PRN  lactated ringers infusion, , Intravenous, Continuous  polyethylene glycol (GLYCOLAX) packet 17 g, 17 g, Oral, Daily PRN  sennosides-docusate sodium (SENOKOT-S) 8.6-50 MG tablet 1 tablet, 1 tablet, Oral, BID  bisacodyl (DULCOLAX) EC tablet 5 mg, 5 mg, Oral, Once  traMADol (ULTRAM) tablet 50 mg, 50 mg, Oral, Q6H PRN  albuterol (PROVENTIL) nebulizer solution 2.5 mg, 2.5 mg, Nebulization, Q6H PRN  Facility-Administered Medications Ordered in Other Encounters: lactated ringers infusion, , , Continuous PRN  ondansetron (ZOFRAN) injection, , , PRN  propofol injection, , , Continuous PRN  glucagon (rDNA) injection, , , PRN    Allergies:  Codeine    Social History:   Social History     Socioeconomic History    Marital status:      Spouse name: Not on file    Number of children: Not on file    Years of education: Not on file  Highest education level: Not on file   Occupational History    Not on file   Social Needs    Financial resource strain: Not hard at all    Food insecurity     Worry: Never true     Inability: Never true   [employer] needs     Medical: No     Non-medical: No   Tobacco Use    Smoking status: Never Smoker    Smokeless tobacco: Never Used   Substance and Sexual Activity    Alcohol use: Never     Alcohol/week: 5.0 standard drinks     Types: 5 Shots of liquor per week     Frequency: Never    Drug use: Never    Sexual activity: Never   Lifestyle    Physical activity     Days per week: Not on file     Minutes per session: Not on file    Stress: Not on file   Relationships    Social connections     Talks on phone: Not on file     Gets together: Not on file     Attends [employer] service: Not on file     Active member of club or organization: Not on file     Attends meetings of clubs or organizations: Not on file     Relationship status: Not on file    Intimate partner violence     Fear of current or ex partner: Not on file     Emotionally abused: Not on file     Physically abused: Not on file     Forced sexual activity: Not on file   Other Topics Concern    Not on file   Social History Narrative    Not on file       Family History:         Problem Relation Age of Onset    Heart Disease Mother [de-identified]    Heart Disease Father     Cancer Father 76        lung         PHYSICAL EXAM:      Vitals:  BP (!) 114/59   Pulse 62   Temp 96.8 °F (36 °C) (Tympanic)   Resp 14   Ht 5' 2\" (1.575 m)   Wt 108 lb (49 kg)   SpO2 96%   BMI 19.75 kg/m²   Patient is alert and oriented. Not in apparent distress  Eyes PERRLA, conjunctive are mildly icteric. Mouth no oral lesions. Neck supple no masses  Chest poor respiratory effort. No rales or rhonchi  Heart regular rhythm no murmurs no stenosis for  Abdomen soft nontender. Palpable mass in the lower epigastric area. No extremities no calf tenderness. Bilateral pedal edema. No facial asymmetry. No localized weakness in the upper lower extremities. Skin no rashes or bruises.     DATA:      CMP:    Lab Results   Component Value Date     01/18/2021    K 3.6 01/18/2021    K 3.7 01/15/2021     01/18/2021    CO2 26 01/18/2021    BUN 11 01/18/2021    PROT 5.6 01/18/2021       CBC:    Recent Labs     01/16/21  0325 01/17/21  0655 01/18/21  0312   WBC 5.9 5.5 7.3   HGB 10.7* 9.3* 9.2*    157 176       Radiology:    Ct Chest W Contrast    Result Date: 1/16/2021 EXAMINATION: CT OF THE CHEST WITH CONTRAST 1/16/2021 10:34 am TECHNIQUE: CT of the chest was performed with the administration of intravenous contrast. Multiplanar reformatted images are provided for review. Dose modulation, iterative reconstruction, and/or weight based adjustment of the mA/kV was utilized to reduce the radiation dose to as low as reasonably achievable. COMPARISON: Portable chest dated 01/15/2021 and CT chest dated 07/10/2020 HISTORY: ORDERING SYSTEM PROVIDED HISTORY: pancreatic mass TECHNOLOGIST PROVIDED HISTORY: Reason for exam:->pancreatic mass FINDINGS: Mediastinum:  Thyroid is grossly unremarkable within the limits of CT. No evidence of mediastinal, hilar or axillary lymphadenopathy. There is a tiny 3 mm nodular density along the right anterolateral aspect of the distal trachea with suggestive tiny calcification along the wall. This is nonspecific could be related to debris. The central airways are otherwise clear. Heart and pericardium are unremarkable. Coronary and aortic atherosclerotic calcifications are noted. . Lungs/pleura: There are emphysematous changes. Strandy chronic appearing parenchymal densities are seen in the posterolateral right upper lobe with slight bronchial thickening on series 13 images 44 through 55. Other scattered chronic fibrotic changes are seen in both lung bases. No acute airspace disease or pulmonary mass is seen. No evidence of pleural effusion or pneumothorax. Upper Abdomen: Please refer to the report for CT abdomen. Soft Tissues/Bones:  No acute abnormality of the visualized osseous structures. There is advanced degenerative change of the thoracolumbar spine with levoscoliosis.     Xr Chest Portable    Result Date: 1/15/2021 1. Marked dilatation of the proximal to mid extrahepatic bile duct measuring up to 1.7 cm with associated moderate intrahepatic ductal dilatation. An abrupt transition is seen approximately 1.2 cm proximal to the ampulla. The distal common bile duct is normal caliber and no definite intraductal filling defect seen. These findings are new from chest CTs in 2019 and could be due to a biliary stricture. Recommend ERCP for further evaluation. 2. Cystic foci are seen within the pancreatic head. In addition, there may be subtle peripancreatic inflammation better seen on CT. When correlated with elevated lipase, these findings suggest acute pancreatitis with acute peripancreatic fluid collections (APFCs). Lack of intravenous contrast precludes definitive evaluation for underlying mass. The pancreatic duct is mildly dilated and irregular, also suggesting chronic pancreatitis. The pancreatic head findings can be further evaluated with endoscopic ultrasound. Otherwise a follow-up pancreatic CT or MRI is recommended in 1 month to evaluate for resolution of these changes and determine whether there is an underlying pancreatic head mass. The findings were sent to the Radiology Results [address] at 1:45 pm on 1/16/2021 to be communicated to a licensed caregiver.     Cta Triphasic Pancreas    Result Date: 1/16/2021 EXAMINATION: CT ABDOMEN WITH AND WITHOUT CONTRAST 1/16/2021 10:34 am TECHNIQUE: CT of the abdomen was performed without and with the administration of intravenous contrast. Multiplanar reformatted images are provided for review. Dose modulation, iterative reconstruction, and/or weight based adjustment of the mA/kV was utilized to reduce the radiation dose to as low as reasonably achievable. Arterial phase, parenchymal and portal venous phase contrast enhanced imaging was done. COMPARISON: None. HISTORY: ORDERING SYSTEM PROVIDED HISTORY: pancreatic mass TECHNOLOGIST PROVIDED HISTORY: Reason for exam:->pancreatic mass FINDINGS: Lower Chest:  Visualized portion of the lower chest demonstrates no acute abnormality. Organs: There is no evidence of a hepatic mass. Small amount of perihepatic ascites is seen. There is marked intrahepatic biliary dilatation and dilatation of the common hepatic duct which measures up to 1.7 cm in diameter. There is a complex partially cystic and partially scratch enhancing mass involving the head of the pancreas seen on series 7, images 26 through 31 measuring at least 4.2 cm in maximal diameter. This causes compression of the common bile duct and the pancreatic duct. The pancreatic duct is also dilated proximal to the mass. No obvious mass is seen in the body or tail of the pancreas. The gallbladder is also dilated, probably due to the biliary obstruction. There is small amount of pericholecystic fluid laterally that is probably related to the ascites. Clinical correlation for signs of acute cholecystitis is suggested. There is no evidence of encasement of the celiac or superior mesenteric arteries. The splenic vein and portal vein are patent. There is compression and obstruction of the superior mesenteric vein by the mass, just below the confluence with the splenic vein. Multiple collateral veins are seen in the left mid abdomen. The spleen and adrenals are unremarkable.   Tiny probable cyst is seen in the upper pole of the right kidney, too small to characterize with certainty. The kidneys otherwise enhance symmetrically without evidence of hydronephrosis. GI/Bowel: There is loss of fat plane between the pancreatic mass and gastric antrum. Marked wall thickening and edema is seen involving the antrum and distal body of the stomach. Otherwise, there is no evidence to suggest bowel obstruction or abnormal small bowel thickening. There is some distention of large and small bowel, probably due to ileus. No definite free air is seen. Peritoneum/Retroperitoneum: No definite evidence of retroperitoneal or intraperitoneal lymphadenopathy. The celiac artery shows plaque at its origin causing stenosis about 60-70% by diameter. There is mild soft plaque in the proximal SMA without significant stenosis. About 70% stenosis is also seen at the origin of the ALEXI. Bones/Soft Tissues:  No acute abnormality of the visualized osseous structures. There is advanced degenerative change in the lumbar spine with grade 1-2 anterolisthesis at L4-5.    1. Large complex 4.2 cm mass involving the head of the pancreas associated with biliary obstruction. There is marked intra and extrahepatic biliary dilatation and dilatation of the gallbladder as well as dilatation of the pancreatic duct. There is also evidence of obstruction of the superior mesenteric vein. 2. Minimal amount of pericholecystic fluid that is likely related to small amount of perihepatic ascites. Clinical correlation is suggested. 3. Marked thickening and wall edema involving the distal stomach most likely related to invasion by the pancreatic tumor. 4. 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 About 70% stenosis at the ALEXI origin. There is no evidence of tumor encasing the celiac or SMA.       IMPRESSION: 27-year-old female who presents with abdominal pain for 3 weeks was found to have a large 4.2 cm mass involving the head of the pancreas associated with biliary obstruction. 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 RECOMMENDATIONS:  MRI/CT reviewed   She has a pancreatic mass/cystic lesion that has caused a biliary obstruction, on triphasic showed SMV invasion  We will need pathology to determine further recommendations  She is for an ERCP with stent placement   and CEA are elevated   Further recommendations to come  Will discuss with     Electronically signed by LORENA Miller NP on 1/18/2021 at 1:47 PM     Patient was seen and examined. Case was discussed with Mrs. Steph Jeffrey. I agree with above assessment and recommendations. Patient has been experiencing worsening fatigue and nonspecific epigastric pain over the last few weeks. No reported weight loss or decreased appetite. On further questioning patient has been experiencing progressive fatigue over the last 6 months that initially thought to be related to her Parkinson disease. The pancreatic cystic mass is suspicious of malignancy. Work-up is currently in progress. She had ERCP. Not sure if FNA or cytology were obtained. She will probably require her diagnostic EUS. Pancreatic malignancy is high on the differential.    I suspect that she has poor baseline performance status. Treatment will probably be challenging. Continue current supportive care. Further recommendations once diagnosis work-up is completed.

## 2021-01-18 NOTE — PROGRESS NOTES
[facility]  DAILY PROGRESS NOTE    Date:2021       MAGNOLIA:[id number]  Patient Mady Balderas     YOB: 1935     Age:85 y.o. Chief Complaint:  Chief Complaint   Patient presents with    Abdominal Pain     x 3 weeks, sent in by princess for obstructive pancreatic cyst     Nausea    Constipation     per CT         Subjective:  Abdomen feeling much better, but had pain last night. No nausea. Objective:  /63   Pulse 83   Temp 98.4 °F (36.9 °C) (Oral)   Resp 16   Ht 5' 2\" (1.575 m)   Wt 108 lb (49 kg)   SpO2 96%   BMI 19.75 kg/m²   Temp (24hrs), Av.4 °F (36.9 °C), Min:98.1 °F (36.7 °C), Max:98.6 °F (37 °C)      I/O (24Hr):  I/O last 3 completed shifts: In: 500 [I.V.:500]  Out: -      GENERAL:  No acute distress. Alert and interactive. LUNGS:  No cough. Nonlabored breathing on room air. CARDIOVASC:  Normal rate, no cyanosis. ABDOMEN:  Soft, mildly-distended, non-tender. No guarding / rigidity / rebound. EXTREMITIES:  No UE edema, no deformities. Assessment:  80 y.o. female with obstructive jaundice secondary to cystic pancreatic head mass 4 cm.   - CA 19-9 normal, CEA and  elevated  - prior hx tubular adenoma     Plan:   - ERCP by GI today    Electronically signed by Gabriela Morris MD on 2021 at 5:21 AM    Appears to have an uncinate process cystic mass concerning for cystadenocarcinoma.   On Triphasic there is SMV invasion (appears more than 180 degrees) - T3/4  ERCP today  EUS next week with Dr. Saul Dale  Discussed case with Dr. Kiran Bond, will consult Dr. Zoe Godinez    Electronically signed by Cruz Boyce MD on 2021 at 9:46 AM

## 2021-01-18 NOTE — ANESTHESIA POSTPROCEDURE EVALUATION
Department of Anesthesiology  Postprocedure Note    Patient: Simón Knight  MRN: [id number]  YOB: 1935  Date of evaluation: 1/18/2021  Time:  3:20 PM     Procedure Summary     Date: 01/18/21 Room / Location: [facility] / [facility]    Anesthesia Start: 0170 Anesthesia Stop: 9841    Procedures:       ERCP SPHINCTER/PAPILLOTOMY (N/A )      ERCP STENT INSERTION Diagnosis: (/)    Surgeons: Brad Prader, MD Responsible Provider: Brenton Jeff MD    Anesthesia Type: MAC, general ASA Status: 3          Anesthesia Type: No value filed. Helder Phase I:      Helder Phase II: Helder Score: 9    Last vitals: Reviewed and per EMR flowsheets.        Anesthesia Post Evaluation    Patient location during evaluation: bedside  Patient participation: complete - patient participated  Level of consciousness: awake and alert  Pain score: 1  Airway patency: patent  Nausea & Vomiting: no nausea and no vomiting  Complications: no  Cardiovascular status: hemodynamically stable  Respiratory status: acceptable  Hydration status: euvolemic

## 2021-01-18 NOTE — PROGRESS NOTES
P Quality Flow/Interdisciplinary Rounds Progress Note        Quality Flow Rounds held on January 18, 2021    Disciplines Attending:  Bedside Nurse, ,  and Nursing Unit Leadership    Melvi Mendez was admitted on 1/15/2021  2:51 PM    Anticipated Discharge Date:  Expected Discharge Date: 01/18/21    Disposition:    Adan Score:  Adan Scale Score: 20    Readmission Risk              Risk of Unplanned Readmission:        8           Discussed patient goal for the day, patient clinical progression, and barriers to discharge.   The following Goal(s) of the Day/Commitment(s) have been identified:  Discharge [address] [place]  January 18, 2021

## 2021-01-18 NOTE — ANESTHESIA PRE PROCEDURE
Department of Anesthesiology  Preprocedure Note       Name:  Tara Arroyo   Age:  80 y.o.  :  1935                                          MRN:  [id number]         Date:  2021      Surgeon: Carlos Silvestre):  Lindsay Paredes MD    Procedure: Procedure(s):  ERCP ENDOSCOPIC RETROGRADE CHOLANGIOPANCREATOGRAPHY    Medications prior to admission:   Prior to Admission medications    Medication Sig Start Date End Date Taking?  Authorizing Provider   omeprazole (PRILOSEC) 40 MG delayed release capsule Take 1 capsule by mouth daily 21  Yes Jared Coffey MD   simvastatin (ZOCOR) 40 MG tablet TAKE 1 TABLET BY MOUTH AT  NIGHT 10/30/20  Yes Jared Coffey MD   valsartan-hydrochlorothiazide (DIOVAN-HCT) 160-12.5 MG per tablet TAKE 1 TABLET BY MOUTH  DAILY 20  Yes Janeth Calvert MD   albuterol sulfate  (90 Base) MCG/ACT inhaler Inhale 2 puffs into the lungs every 6 hours as needed for Wheezing or Shortness of Breath 10/7/19  Yes Jared Coffey MD   Multiple Vitamins-Minerals (THERAPEUTIC MULTIVITAMIN-MINERALS) tablet Take 1 tablet by mouth every morning   Yes Historical Provider, MD   PARoxetine (PAXIL) 10 MG tablet Take 10 mg by mouth every morning  19  Yes Historical Provider, MD   carbidopa-levodopa (SINEMET)  MG per tablet Take 1 tablet by mouth 3 times daily   Yes Historical Provider, MD   aspirin 81 MG tablet Take 81 mg by mouth nightly    Yes Historical Provider, MD   fish oil-omega-3 fatty acids 1000 MG capsule Take 2 g by mouth every morning    Yes Historical Provider, MD   Calcium Carbonate-Vitamin D (CALCIUM + D) 600-200 MG-UNIT TABS Take 1 tablet by mouth every morning    Yes Historical Provider, MD   Coenzyme Q10 (CO Q 10) 100 MG CAPS Take 1 capsule by mouth every morning    Yes Historical Provider, MD   Handicap Placard MISC by Does not apply route Pt cannot walk 200 ft without stopping Duration: Lifetime 19   Jared Coffey MD Current medications:    Current Facility-Administered Medications   Medication Dose Route Frequency Provider Last Rate Last Admin    indomethacin (INDOCIN) 50 MG suppository 100 mg  100 mg Rectal See Admin Instructions LORENA Colon CNP        ciprofloxacin (CIPRO) IVPB 400 mg  400 mg Intravenous 60 Min Pre-Op LORENA Colon CNP        lactated ringers bolus  1,000 mL Intravenous Once LORENA Colon  mL/hr at 01/18/21 1029 1,000 mL at 01/18/21 1029    aspirin chewable tablet 81 mg  81 mg Oral Nightly Marely Ferguson MD   81 mg at 01/16/21 2214    carbidopa-levodopa (SINEMET)  MG per tablet 1 tablet  1 tablet Oral TID Vahid Domingo MD   1 tablet at 01/17/21 2301    pantoprazole (PROTONIX) tablet 40 mg  40 mg Oral QAM AC Marely Ferguson MD   40 mg at 01/17/21 0908    PARoxetine (PAXIL) tablet 10 mg  10 mg Oral QAM Marely Ferguson MD   10 mg at 01/17/21 1248    atorvastatin (LIPITOR) tablet 20 mg  20 mg Oral Daily Marely Ferguson MD   20 mg at 01/17/21 0909    sodium chloride flush 0.9 % injection 10 mL  10 mL Intravenous 2 times per day Marely Ferguson MD   10 mL at 01/18/21 1031    sodium chloride flush 0.9 % injection 10 mL  10 mL Intravenous PRN Vahid Domingo MD        lactated ringers infusion   Intravenous Continuous Marely Ferguson  mL/hr at 01/15/21 2227 New Bag at 01/15/21 2227    polyethylene glycol (GLYCOLAX) packet 17 g  17 g Oral Daily PRN Marely Ferguson MD        sennosides-docusate sodium (SENOKOT-S) 8.6-50 MG tablet 1 tablet  1 tablet Oral BID Vahid Domingo MD   1 tablet at 01/17/21 2123    bisacodyl (DULCOLAX) EC tablet 5 mg  5 mg Oral Once Marely Ferguson MD        traMADol (ULTRAM) tablet 50 mg  50 mg Oral Q6H PRN Marely Ferguson MD   50 mg at 01/17/21 2301    albuterol (PROVENTIL) nebulizer solution 2.5 mg  2.5 mg Nebulization Q6H PRN Vahid Domingo MD           Allergies:     Allergies   Allergen Reactions    Codeine Nausea Only Problem List:    Patient Active Problem List   Diagnosis Code    HTN (hypertension) I10    Hyperlipidemia E78.5    Pain of left lower extremity M79.605    Left knee pain M25.562    Parkinson's disease ([facility] G20    Pneumonia J18.9    Cystic mass of pancreas K86.2    Obstructive jaundice due to cancer ([facility] K83.1, C80.1       Past Medical History:        Diagnosis Date    Asthma     Hyperlipidemia     Hypertension     Parkinson disease ([facility]        Past Surgical History:        Procedure Laterality Date    COLONOSCOPY  10/2010    HYSTERECTOMY      UPPER GASTROINTESTINAL ENDOSCOPY  2010       Social History:    Social History     Tobacco Use    Smoking status: Never Smoker    Smokeless tobacco: Never Used   Substance Use Topics    Alcohol use: Never     Alcohol/week: 5.0 standard drinks     Types: 5 Shots of liquor per week     Frequency: Never                                Counseling given: Not Answered      Vital Signs (Current):   Vitals:    01/17/21 1925 01/18/21 0026 01/18/21 0457 01/18/21 0745   BP: 118/69 (!) 161/63 131/63 (!) 153/67   Pulse: 75 84 83 79   Resp: 16 16 16 16   Temp: 98.6 °F (37 °C) 98.6 °F (37 °C) 98.4 °F (36.9 °C) 98 °F (36.7 °C)   TempSrc: Oral Oral Oral Oral   SpO2: 97% 96%  97%   Weight:       Height:                                                  BP Readings from Last 3 Encounters:   01/18/21 (!) 153/67   01/06/21 136/79   12/09/20 129/72       NPO Status:                                                                                 BMI:   Wt Readings from Last 3 Encounters:   01/15/21 108 lb (49 kg)   12/09/20 113 lb 3.2 oz (51.3 kg)   07/29/20 108 lb (49 kg)     Body mass index is 19.75 kg/m².     CBC:   Lab Results   Component Value Date    WBC 7.3 01/18/2021    RBC 3.14 01/18/2021    HGB 9.2 01/18/2021    HCT 29.7 01/18/2021    MCV 94.6 01/18/2021    RDW 13.5 01/18/2021     01/18/2021       CMP:   Lab Results   Component Value Date     01/18/2021

## 2021-01-18 NOTE — PROGRESS NOTES
Nilesh Saenz Hospitalist   Progress Note    Admitting Date and Time: 1/15/2021  2:51 PM  Admit Dx: Pancreatic mass [K86.89]     Seen for follow-up on multiple problems as listed below    Subjective:  Continues with nausea , upper abdominal pain , poor po ,denies CP ,sob . Uneventful night. D/w nursing. ROS: denies fever, chills, cp, sob, HA unless stated above.      indomethacin  100 mg Rectal See Admin Instructions    ciprofloxacin  400 mg Intravenous 60 Min Pre-Op    lactated ringers bolus  1,000 mL Intravenous Once    aspirin  81 mg Oral Nightly    carbidopa-levodopa  1 tablet Oral TID    pantoprazole  40 mg Oral QAM AC    PARoxetine  10 mg Oral QAM    atorvastatin  20 mg Oral Daily    sodium chloride flush  10 mL Intravenous 2 times per day    sennosides-docusate sodium  1 tablet Oral BID    bisacodyl  5 mg Oral Once         sodium chloride flush, 10 mL, PRN      polyethylene glycol, 17 g, Daily PRN      traMADol, 50 mg, Q6H PRN      albuterol, 2.5 mg, Q6H PRN         Objective:    BP (!) 153/67   Pulse 79   Temp 98 °F (36.7 °C) (Oral)   Resp 16   Ht 5' 2\" (1.575 m)   Wt 108 lb (49 kg)   SpO2 97%   BMI 19.75 kg/m²   General Appearance: alert and oriented to person, place and time, underweight, in no acute distress  Skin: warm and dry  Head: normocephalic and atraumatic  Eyes: pupils equal, round, and reactive to light, extraocular eye movements intact, conjunctivae normal  Neck: supple and non-tender without mass  Pulmonary/Chest: clear to auscultation bilaterally  Cardiovascular: normal rate, regular rhythm, normal S1 and S2  Abdomen: soft,mild diffuse upper abdominal tenderess, non-distended, normal bowel sounds, no masses or organomegaly  Extremities: no cyanosis, clubbing   Musculoskeletal: normal range of motion  Neurologic:  no cranial nerve deficit,speech normal      Recent Labs     01/16/21  0325 01/17/21  0655 01/18/21  0312    138 136   K 3.6 3.5 3.6  105 104   CO2 25 25 26   BUN 13 15 11   CREATININE 0.6 0.6 0.5   GLUCOSE 104* 123* 132*   CALCIUM 8.9 8.6 8.3*       Recent Labs     01/16/21  0325 01/17/21  0655 01/18/21  0312   WBC 5.9 5.5 7.3   RBC 3.59 3.08* 3.14*   HGB 10.7* 9.3* 9.2*   HCT 34.1 28.9* 29.7*   MCV 95.0 93.8 94.6   MCH 29.8 30.2 29.3   MCHC 31.4* 32.2 31.0*   RDW 13.2 13.4 13.5    157 176   MPV 10.3 10.5 10.4       Labs and images reviewed    Radiology:   MRI ABDOMEN WO CONTRAST MRCP   Final Result   1. Marked dilatation of the proximal to mid extrahepatic bile duct measuring   up to 1.7 cm with associated moderate intrahepatic ductal dilatation. An   abrupt transition is seen approximately 1.2 cm proximal to the ampulla. The   distal common bile duct is normal caliber and no definite intraductal filling   defect seen. These findings are new from chest CTs in 2019 and could be due   to a biliary stricture. Recommend ERCP for further evaluation. 2. Cystic foci are seen within the pancreatic head. In addition, there may   be subtle peripancreatic inflammation better seen on CT. When correlated   with elevated lipase, these findings suggest acute pancreatitis with acute   peripancreatic fluid collections (APFCs). Lack of intravenous contrast   precludes definitive evaluation for underlying mass. The pancreatic duct is   mildly dilated and irregular, also suggesting chronic pancreatitis. The   pancreatic head findings can be further evaluated with endoscopic ultrasound. Otherwise a follow-up pancreatic CT or MRI is recommended in 1 month to   evaluate for resolution of these changes and determine whether there is an   underlying pancreatic head mass. The findings were sent to the Radiology Results [address] at 1:45   pm on 1/16/2021 to be communicated to a licensed caregiver. CTA TRIPHASIC PANCREAS   Final Result   1.  Large complex 4.2 cm mass involving the head of the pancreas associated with biliary obstruction. There is marked intra and extrahepatic biliary   dilatation and dilatation of the gallbladder as well as dilatation of the   pancreatic duct. There is also evidence of obstruction of the superior   mesenteric vein. 2. Minimal amount of pericholecystic fluid that is likely related to small   amount of perihepatic ascites. Clinical correlation is suggested. 3. Marked thickening and wall edema involving the distal stomach most likely   related to invasion by the pancreatic tumor. 4. 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 About 70% stenosis at   the ALEXI origin. There is no evidence of tumor encasing the celiac or SMA. CT CHEST W CONTRAST   Final Result      XR CHEST PORTABLE   Final Result   No acute process. FL ERCP BILIARY AND PANCREATIC S&I    (Results Pending)       Assessment:    Active Problems:    Cystic mass of pancreas  Resolved Problems:    * No resolved hospital problems. *      Plan:  Pancreatic mass/cystic lesion leading to biliary obstruction & elevated LFTs /jaundice -GI &  Surgery is consulted and following. Has elevated CEA and Ca 125. CA 19/9  normal . MRI /CT reviewed  S/o biliary stricture and pancreatic cystic lesion . She is scheduled for ERCP today. As per surg depending on findings of ERCP she might need to follow-up with Dr. Mary Gleason once he returns- EUS next week. Constipation-patient denies but CT with high stool burden, on laxatives as needed. Parkinson's disease-on Sinemet  Hypertension-BP borderline, home meds on hold. Monitor. Re - evaluate on discharge. On SCDs for DVT prophylaxis, pending surgical evaluation.   Full code        Electronically signed by Galo Wheat MD on 1/18/2021 at 9:00 AM

## 2021-01-19 NOTE — PROGRESS NOTES
P Quality Flow/Interdisciplinary Rounds Progress Note        Quality Flow Rounds held on January 19, 2021    Disciplines Attending:  Bedside Nurse, ,  and Nursing Unit Leadership    Kat Hong was admitted on 1/15/2021  2:51 PM    Anticipated Discharge Date:  Expected Discharge Date: 01/18/21    Disposition:    Adan Score:  Adan Scale Score: 20    Readmission Risk              Risk of Unplanned Readmission:        10           Discussed patient goal for the day, patient clinical progression, and barriers to discharge.   The following Goal(s) of the Day/Commitment(s) have been identified:  Discharge [address] [place]  January 19, 2021

## 2021-01-19 NOTE — TELEPHONE ENCOUNTER
Prior Authorization Form:      DEMOGRAPHICS:                     Patient Name:  Gill Zuniga  Patient :  1935            Insurance:  Payor: MEDICARE / Plan: MEDICARE PART A AND B / Product Type: *No Product type* /   Insurance ID Number:    Payor/Plan Subscr  Sex Relation Sub. Ins. ID Effective Group Num   1. MEDICARE - Willie Mcpherson 1935 Female Self [id number] 1/1/15                                    [address] [address]   2.  GPM - GPM Aleks Fajardo 1935 Female Self [id number] 1/1/15 Type G                                   [address]         DIAGNOSIS & PROCEDURE:                       Procedure/Operation: EUS with biospy           CPT Code: 56315    Diagnosis:  Cystic mass pancreas    ICD10 Code: K86.2    Location:  [place] [address]    Surgeon:  Sharan Grover    SCHEDULING INFORMATION:                          Date: 2021    Time: TBD              Anesthesia:  LMAC                                                       Status:  Outpatient        Special Comments:         Electronically signed by Josefina Rojas on 2021 at 12:53 PM

## 2021-01-19 NOTE — DISCHARGE SUMMARY
[facility] EMERGENCY SERVICE Physician Discharge Summary       Tejas Peacock, [address] [facility]  [address] [facility]  [address]  [phone]    Call  You already have an appointment scheduled for EUS procedure next week. Please check with Dr Piotr Spencer office by calling the number [phone]. Alisa Delatorre, [address] [facility] [address] [place] [address]  [phone]    Call  Please call to make an appointment with Dr Luz Maria Mcclain approximately 1 week after your EUS procedure is done with Dr Acosta Pearson so that you can discuss biopsy results. Brad Prader, [address] [address]  [address] [place] [address], [address] [address]  [phone]          Activity level: As tolerated    Diet: DIET GENERAL;    Dispo: Home with self-care    Condition on discharge stable    Patient ID:  Simón Knight  [id number]  79 y.o.  1935    Admit date: 1/15/2021    Discharge date and time:  1/19/2021  3:21 PM    Admission Diagnoses: Active Problems:    Cystic mass of pancreas    Obstructive jaundice due to cancer [facility])  Resolved Problems:    * No resolved hospital problems. *      Discharge Diagnoses: Active Problems:    Cystic mass of pancreas    Obstructive jaundice due to cancer [facility])  Resolved Problems:    * No resolved hospital problems.  *      Consults:  IP CONSULT TO GENERAL SURGERY  IP CONSULT TO GENERAL SURGERY  IP CONSULT TO IV TEAM  IP CONSULT TO GI  IP CONSULT TO ONCOLOGY  IP CONSULT TO IV TEAM      Hospital Course: She is 51-year-old female with past medical history of Parkinson's disease, hypertension came to the emergency room with upper abdominal, dull periumbilical pain for past few weeks prior to arrival.  She also lost 7 pounds recently but was unable to provide exact timeline. She followed with her PCP and had CT abdomen which showed 4.4 cm cystic lesion in head of the pancreas leading to biliary obstruction. She was admitted on med/surg floor. She was started on supportive treatment. GI and surgery were consulted and following during the hospital stay. She was managed as below. Pancreatic mass/cystic lesion leading to biliary obstruction & elevated LFTs /jaundice -GI &  Surgery were consulted and following. She had elevated CEA and Ca 125 but  CA 19/9 was normal . MRI /CT s/o biliary stricture and pancreatic cystic lesion. She had ERCP on 1/18/21   with papillotomy and stent placement. As above as per surgery she will follow with Dr. Mathew Winter for endoscopic ultrasound. As above she will follow with GI/surgery as suggested. Heme-onc was following during the hospital stay. She is advised to follow with [facility] after EUS is completed in 2 weeks after discharge.     Constipation-patient denied but CT with high stool burden, was treated with  laxatives as needed.     Parkinson's disease- Sinemet as per home    Hypertension-her BP was  borderline, so her home meds were discontinued and she is advised to follow-up with PCP in 1 week .     Discharge Exam:  Vitals:    01/18/21 1435 01/18/21 1530 01/18/21 1930 01/19/21 0830   BP: (!) 154/68 (!) 157/69 137/64 (!) 147/66   Pulse: 68 70 73 85   Resp: 16 16 14 14   Temp:  97.9 °F (36.6 °C) 97.5 °F (36.4 °C) 97.7 °F (36.5 °C)   TempSrc:  Oral Oral Oral   SpO2: 96% 94% 96% 96%   Weight:       Height:           General Appearance: alert and oriented to person, place and time, underweight, in no acute distress  Skin: warm and dry  Head: normocephalic and atraumatic Eyes: pupils equal, round, and reactive to light, extraocular eye movements intact, conjunctivae normal  Neck: supple and non-tender without mass  Pulmonary/Chest: clear to auscultation bilaterally  Cardiovascular: normal rate, regular rhythm, normal S1 and S2  Abdomen: soft, mild diffuse upper abdominal tenderness, non-distended, normal bowel sounds, no masses or organomegaly  Extremities: no cyanosis, clubbing   Musculoskeletal: normal range of motion  Neurologic:  no cranial nerve deficit,speech normal        LABS:  Recent Labs     01/17/21  0655 01/18/21  0312 01/19/21  0245    136 137   K 3.5 3.6 3.5    104 106   CO2 25 26 22   BUN 15 11 9   CREATININE 0.6 0.5 0.6   GLUCOSE 123* 132* 90   CALCIUM 8.6 8.3* 8.2*       Recent Labs     01/17/21 0655 01/18/21 0312 01/19/21  0245   WBC 5.5 7.3 6.8   RBC 3.08* 3.14* 3.25*   HGB 9.3* 9.2* 9.8*   HCT 28.9* 29.7* 31.2*   MCV 93.8 94.6 96.0   MCH 30.2 29.3 30.2   MCHC 32.2 31.0* 31.4*   RDW 13.4 13.5 13.6    176 167   MPV 10.5 10.4 10.3       No results for input(s): POCGLU in the last 72 hours. Imaging:   FL ERCP BILIARY AND PANCREATIC S&I   Final Result   Unremarkable ERCP images. Please refer to the procedure report for further   details. MRI ABDOMEN WO CONTRAST MRCP   Final Result   1. Marked dilatation of the proximal to mid extrahepatic bile duct measuring   up to 1.7 cm with associated moderate intrahepatic ductal dilatation. An   abrupt transition is seen approximately 1.2 cm proximal to the ampulla. The   distal common bile duct is normal caliber and no definite intraductal filling   defect seen. These findings are new from chest CTs in 2019 and could be due   to a biliary stricture. Recommend ERCP for further evaluation. 2. Cystic foci are seen within the pancreatic head. In addition, there may   be subtle peripancreatic inflammation better seen on CT.   When correlated with elevated lipase, these findings suggest acute pancreatitis with acute   peripancreatic fluid collections (APFCs). Lack of intravenous contrast   precludes definitive evaluation for underlying mass. The pancreatic duct is   mildly dilated and irregular, also suggesting chronic pancreatitis. The   pancreatic head findings can be further evaluated with endoscopic ultrasound. Otherwise a follow-up pancreatic CT or MRI is recommended in 1 month to   evaluate for resolution of these changes and determine whether there is an   underlying pancreatic head mass. The findings were sent to the Radiology Results [address] at 1:45   pm on 1/16/2021 to be communicated to a licensed caregiver. CTA TRIPHASIC PANCREAS   Final Result   1. Large complex 4.2 cm mass involving the head of the pancreas associated   with biliary obstruction. There is marked intra and extrahepatic biliary   dilatation and dilatation of the gallbladder as well as dilatation of the   pancreatic duct. There is also evidence of obstruction of the superior   mesenteric vein. 2. Minimal amount of pericholecystic fluid that is likely related to small   amount of perihepatic ascites. Clinical correlation is suggested. 3. Marked thickening and wall edema involving the distal stomach most likely   related to invasion by the pancreatic tumor. 4. 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 About 70% stenosis at   the ALEXI origin. There is no evidence of tumor encasing the celiac or SMA. CT CHEST W CONTRAST   Final Result      XR CHEST PORTABLE   Final Result   No acute process. Patient Instructions:      Medication List      START taking these medications    traMADol 50 MG tablet  Commonly known as: ULTRAM  Take 1 tablet by mouth every 6 hours as needed for Pain for up to 3 days.         CONTINUE taking these medications    albuterol sulfate  (90 Base) MCG/ACT inhaler Inhale 2 puffs into the lungs every 6 hours as needed for Wheezing or Shortness of Breath     aspirin 81 MG tablet     Calcium + D 600-200 MG-UNIT Tabs  Generic drug: calcium carbonate-vitamin D     carbidopa-levodopa  MG per tablet  Commonly known as: SINEMET     Co Q 10 100 MG Caps     fish oil-omega-3 fatty acids 1000 MG capsule     Handicap Placard Misc  by Does not apply route Pt cannot walk 200 ft without stopping Duration: Lifetime     omeprazole 40 MG delayed release capsule  Commonly known as: PriLOSEC  Take 1 capsule by mouth daily     PARoxetine 10 MG tablet  Commonly known as: PAXIL     simvastatin 40 MG tablet  Commonly known as: ZOCOR  TAKE 1 TABLET BY MOUTH AT  NIGHT     therapeutic multivitamin-minerals tablet        STOP taking these medications    nitrofurantoin (macrocrystal-monohydrate) 100 MG capsule  Commonly known as: MACROBID     valsartan-hydrochlorothiazide 160-12.5 MG per tablet  Commonly known as: DIOVAN-HCT           Where to Get Your Medications      You can get these medications from any pharmacy    Bring a paper prescription for each of these medications  · traMADol 50 MG tablet           Note that more  than 30 minutes was spent in preparing discharge papers, discussing discharge with patient, medication review, etc.    Signed:  Electronically signed by Manjinder Marquez MD on 1/19/2021 at 3:21 PM    NOTE: This report was transcribed using voice recognition software. Every effort was made to ensure accuracy; however, inadvertent computerized transcription errors may be present.

## 2021-01-19 NOTE — PROGRESS NOTES
[facility] SURGERY  DAILY PROGRESS NOTE    Date:2021       [id number]  Patient Zara Harada     YOB: 1935     Age:85 y.o. Chief Complaint:  Chief Complaint   Patient presents with    Abdominal Pain     x 3 weeks, sent in by princess for obstructive pancreatic cyst     Nausea    Constipation     per CT         Subjective:  Denies pain, nausea vomiting. Objective:  /64   Pulse 73   Temp 97.5 °F (36.4 °C) (Oral)   Resp 14   Ht 5' 2\" (1.575 m)   Wt 108 lb (49 kg)   SpO2 96%   BMI 19.75 kg/m²   Temp (24hrs), Av.2 °F (36.2 °C), Min:96.5 °F (35.8 °C), Max:97.9 °F (36.6 °C)      I/O (24Hr):  I/O last 3 completed shifts: In: 520 [P.O.:120; I.V.:400]  Out: -      GENERAL:  No acute distress. Alert and interactive. LUNGS:  No cough. Nonlabored breathing on room air. CARDIOVASC:  Normal rate, no cyanosis. ABDOMEN:  Soft, mildly-distended, non-tender. No guarding / rigidity / rebound. EXTREMITIES:  No UE edema, no deformities. Assessment:  80 y.o. 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 On Triphasic there is SMV invasion (appears more than 180 degrees) - T3/4.  ERCP yesterday demonstrated extrinsic compression from panc head mass with possible liver metastasis  - CA 19-9 normal, CEA and  elevated  - prior hx tubular adenoma     Plan:   - [id number] [address] for diet  - PRN pain medication  - For EUS next week with Dr. Ottoniel Albarran    Electronically signed by Ayo Martinez DO on 2021 at 8:26 AM    Agree with above  Follow up with me in the office on Friday as previously scheduled    Electronically signed by Shawn Bronson MD on 2021 at 1:48 PM

## 2021-01-19 NOTE — PROGRESS NOTES
  indomethacin (INDOCIN) 50 MG suppository 100 mg, 100 mg, Rectal, See Admin Instructions, LORENA Schulz - CNP, 100 mg at 01/18/21 1127    ciprofloxacin (CIPRO) IVPB 400 mg, 400 mg, Intravenous, 60 Min Pre-Op, LORENA Schulz - CNP, Stopped at 01/18/21 1305    aspirin chewable tablet 81 mg, 81 mg, Oral, Nightly, aMrely Ferguson MD, 81 mg at 01/18/21 2140    carbidopa-levodopa (SINEMET)  MG per tablet 1 tablet, 1 tablet, Oral, TID, Marely Ferguson MD, 1 tablet at 01/19/21 0844    pantoprazole (PROTONIX) tablet 40 mg, 40 mg, Oral, QAM AC, Marely Ferguson MD, 40 mg at 01/19/21 0844    PARoxetine (PAXIL) tablet 10 mg, 10 mg, Oral, QAM, Marely Ferguson MD, 10 mg at 01/19/21 0844    atorvastatin (LIPITOR) tablet 20 mg, 20 mg, Oral, Daily, Marely Ferguson MD, 20 mg at 01/19/21 0844    sodium chloride flush 0.9 % injection 10 mL, 10 mL, Intravenous, 2 times per day, Marely Ferguson MD, 10 mL at 01/19/21 0845    sodium chloride flush 0.9 % injection 10 mL, 10 mL, Intravenous, PRN, Marely Ferguson MD, 10 mL at 01/18/21 1223    lactated ringers infusion, , Intravenous, Continuous, Marely Ferguson MD, Last Rate: 100 mL/hr at 01/15/21 2227, New Bag at 01/15/21 2227    polyethylene glycol (GLYCOLAX) packet 17 g, 17 g, Oral, Daily PRN, Ro Chan MD    sennosides-docusate sodium (SENOKOT-S) 8.6-50 MG tablet 1 tablet, 1 tablet, Oral, BID, Marely Ferguson MD, 1 tablet at 01/19/21 0844    bisacodyl (DULCOLAX) EC tablet 5 mg, 5 mg, Oral, Once, Marely Ferguson MD    traMADol (ULTRAM) tablet 50 mg, 50 mg, Oral, Q6H PRN, Marely Ferguson MD, 50 mg at 01/17/21 2301    albuterol (PROVENTIL) nebulizer solution 2.5 mg, 2.5 mg, Nebulization, Q6H PRN, Marely Ferguson MD    Assessment:    Active Problems:    Cystic mass of pancreas    Obstructive jaundice due to cancer [facility])  Resolved Problems:    * No resolved hospital problems.  * 27-year-old female who presents with abdominal pain for 3 weeks was found to have a large 4.2 cm mass involving the head of the pancreas associated with biliary obstruction. S/P ERCP with papillotomy and stent placement. CEA and CA-125 is elevated.      Plan:  ERCP showed extrinsic compression from pancreatic head mass  Plan for EUS next week with   Once final work-up is completed and pathology is obtained and resulted, we will have further recommendations  Follow-up at the [facility] with [facility] [place] [address] after completed EUS outpatient 2 weeks after discharge      Electronically signed by LORENA Winters NP on 1/19/2021 at 12:33 PM

## 2021-01-19 NOTE — PROGRESS NOTES
PROGRESS NOTE    Patient Presents with/Seen in Consultation For      Reason for Consult: ERCP  CHIEF COMPLAINT:  Abdominal pain    Subjective:     Patient seen laying in bed. Denies abdominal pain. No BM today. Tolerating liquid diet. Dicussed ERCP results with all questions answered. Review of Systems  Aside from what was mentioned in the PMH and HPI, essentially unremarkable, all others negative. Objective:     BP (!) 147/66   Pulse 85   Temp 97.7 °F (36.5 °C) (Oral)   Resp 14   Ht 5' 2\" (1.575 m)   Wt 108 lb (49 kg)   SpO2 96%   BMI 19.75 kg/m²     General appearance: alert, awake,   Eyes: conjunctiva normal, sclera icteric. PERRL.   Lungs: clear to auscultation bilaterally  Heart: regular rate and rhythm, no murmur, 2+ pulses; 1+ edema to BLE  Abdomen: soft, non-tender; bowel sounds normal; no masses,  no organomegaly  Extremities: extremities 1+ edema  Pulses: 2+ and symmetric  Skin: Skin color pale, texture, turgor normal.   Neurologic: Grossly normal      0.9 % sodium chloride infusion, Continuous      indomethacin (INDOCIN) 50 MG suppository 100 mg, See Admin Instructions      ciprofloxacin (CIPRO) IVPB 400 mg, 60 Min Pre-Op      aspirin chewable tablet 81 mg, Nightly      carbidopa-levodopa (SINEMET)  MG per tablet 1 tablet, TID      pantoprazole (PROTONIX) tablet 40 mg, QAM AC      PARoxetine (PAXIL) tablet 10 mg, QAM      atorvastatin (LIPITOR) tablet 20 mg, Daily      sodium chloride flush 0.9 % injection 10 mL, 2 times per day      sodium chloride flush 0.9 % injection 10 mL, PRN      lactated ringers infusion, Continuous      polyethylene glycol (GLYCOLAX) packet 17 g, Daily PRN      sennosides-docusate sodium (SENOKOT-S) 8.6-50 MG tablet 1 tablet, BID      bisacodyl (DULCOLAX) EC tablet 5 mg, Once      traMADol (ULTRAM) tablet 50 mg, Q6H PRN      albuterol (PROVENTIL) nebulizer solution 2.5 mg, Q6H PRN         Data Review  CBC:   Lab Results   Component Value Date WBC 6.8 01/19/2021    RBC 3.25 01/19/2021    HGB 9.8 01/19/2021    HCT 31.2 01/19/2021    MCV 96.0 01/19/2021    MCH 30.2 01/19/2021    MCHC 31.4 01/19/2021    RDW 13.6 01/19/2021     01/19/2021    MPV 10.3 01/19/2021     CMP:    Lab Results   Component Value Date     01/19/2021    K 3.5 01/19/2021    K 3.7 01/15/2021     01/19/2021    CO2 22 01/19/2021    BUN 9 01/19/2021    CREATININE 0.6 01/19/2021    GFRAA >60 01/19/2021    LABGLOM >60 01/19/2021    GLUCOSE 90 01/19/2021    GLUCOSE 82 04/23/2012    PROT 5.6 01/19/2021    LABALBU 2.8 01/19/2021    LABALBU 4.5 04/23/2012    CALCIUM 8.2 01/19/2021    BILITOT 2.9 01/19/2021    ALKPHOS 727 01/19/2021     01/19/2021    ALT 43 01/19/2021     Hepatic Function Panel:    Lab Results   Component Value Date    ALKPHOS 727 01/19/2021    ALT 43 01/19/2021     01/19/2021    PROT 5.6 01/19/2021    BILITOT 2.9 01/19/2021    BILIDIR 4.4 01/15/2021    IBILI 0.5 01/15/2021    LABALBU 2.8 01/19/2021    LABALBU 4.5 04/23/2012     No components found for: CHLPL    Lab Results   Component Value Date    TRIG 51 01/14/2021    TRIG 66 12/02/2020    TRIG 70 06/17/2020       Lab Results   Component Value Date    HDL 89 01/14/2021    HDL 77 12/02/2020    HDL 87 06/17/2020       Lab Results   Component Value Date    LDLCALC 43 01/14/2021    LDLCALC 64 12/02/2020    LDLCALC 63 06/17/2020       Lab Results   Component Value Date    LABVLDL 10 01/14/2021    LABVLDL 13 12/02/2020    LABVLDL 14 06/17/2020      PT/INR:    Lab Results   Component Value Date    PROTIME 11.5 01/17/2021    INR 1.0 01/17/2021     IRON:  No results found for: IRON  Iron Saturation:  No components found for: PERCENTFE  FERRITIN:  No results found for: FERRITIN      Assessment:   Active problems:  ? Biliary dilation with 4.4 cm pancreatic cystic lesion  ? Jaundice, obstructive - resolved post ERCP with stent placement  ? Increased LFTs, secondary to above  ?  Anemia ? Constipation- resolved  ? Parkinson disease  ? Dysphagia -resolved    Plan:     ? General diet  ? Discharge per PCP, ok from GI POV with outpatient follow up as needed per discharge instructions. Note: This report was completed utilizing computer voice recognition software. Every effort has been made to ensure accuracy, however; inadvertent computerized transcription errors may be present.      Discussed with Dr. Susan Wade per Dr. Yung Malagon, APRN-NP-C 1/19/2021  11:31 AM For Dr. Alexandre Key

## 2021-01-19 NOTE — PROGRESS NOTES
Called [facility]  [phone]  They will be up to see patient before discharge.   Margo Mahmood  1/19/2021  10:33 AM

## 2021-01-19 NOTE — OP NOTE
[address] [address]                                OPERATIVE REPORT    PATIENT NAME: Marysol Hunter                      :        1935  MED REC NO:   [id number]                            ROOM:       8382  ACCOUNT NO:   [de-identified]                           ADMIT DATE: 01/15/2021  PROVIDER:     Belkys Aragon MD    DATE OF PROCEDURE:  2021    PROCEDURE PERFORMED:  ERCP with papillotomy and stent placement. PREOPERATIVE DIAGNOSES:  Obstructive jaundice and a large cystic lesion  in the head of the pancreas. POSTOPERATIVE DIAGNOSES:  Marked outside compression of the gastric  body. A few attempts were done until the scope passed through the  pylorus and the compression continues into the second portion of the  duodenum. Scope had to be manipulated in a long access pattern and  papilla was seen at a distance; however, the scope could not be passed  through a strictured area above the papilla. So, using the long access  and fortunately, the common bile duct could be cannulated and showed a  long filling defect. With somewhat of difficulty, the wire was extended  through the markedly strictured area into a markedly dilated common  hepatic duct and into the intrahepatic ductal system. Visualization of  the intrahepatic ductal system showed multiple filling defects  consistent with possible liver mets. In view of the very _____ anatomy,  a papillotomy was performed in this long access and an 8-Moroccan x 60-mm  fully covered Wallstent was placed with excellent drainage. We did not  attempt brushing for fear of losing access to the biliary system in view  of the very difficult anatomy. Excellent drainage was obtained of tarry  appearing bile and then clear bile was obtained. ANESTHESIA:  LMAC.     ESTIMATED BLOOD LOSS:  N/A    DESCRIPTION OF PROCEDURE:  With the patient in her left lateral decubitus position, the Olympus side-viewing video duodenoscope was  introduced into the esophagus, advanced through the GE junction into the  gastric body. With great difficulty, the scope was then navigated  around a marked outside compression in the gastric body until the  pylorus was visualized and again multiple maneuvers were needed to  introduce the scope through the pyloric ring into the first portion of  the duodenum. Advancing into the second portion of the duodenum was  markedly difficult again secondary to outside compression. The  papillary [place] was seen in the distance. The scope could not be  approximated secondary to compressed segment just above the papilla. Using the long access as well as the papillotome maneuverability, we  engaged the papilla and luckily, a guidewire was successfully introduced  through a markedly strictured distal common bile duct. The guidewire  again was carefully introduced and the injection showed a long filling  defect at the distal common bile duct with massively dilated common  hepatic duct and filling defects scattered through the intrahepatic  ductal system. In view of the difficulty obtaining that, the  papillotome quickly was positioned and adequately sized papillotomy was  performed. Over the guidewire, the papillotome was exchanged with  8-Saudi Arabian 60-mm fully covered Wallstent, which was placed with excellent  drainage obtained. The scope was retrieved, the area decompressed, and  procedure was terminated.         Stephanie Peña MD    D: 01/18/2021 15:11:40       T: 01/18/2021 15:16:18     [email or web address]  Job#: [id number]     Doc#: [id number]    CC:  MD Christopher Olivo MD Charolett Oka, MD

## 2021-01-19 NOTE — TELEPHONE ENCOUNTER
Per the order of Dr. Leane Saint, patient has been scheduled for EUS with biopsy on 1.29.2021. Patient is currently inpatient at [facility] and will be provided procedure information after discharge. Patient has been informed by Dr. Jean Marks that she will be scheduled for procedure with Dr. Leane Saint. Patient instructed to please contact our office with any questions. Patient will follow up with Dr. Jean Marks after procedure. Phone call placed to surgery scheduling to schedule procedure. Dr. Leane Saint to enter orders.   Electronically signed by Marcos Zambrano on 1/19/21 at 12:53 PM EST

## 2021-01-21 NOTE — PROGRESS NOTES
Patient agreed to COVID test on 1-25 at the  [facility] (2-)   located at  off of [address] 8.between the hours of 6 am- 2:30 pm, instructed to bring ID. Patient instructed to self isolate until day of surgery.

## 2021-01-27 NOTE — PROGRESS NOTES
[address] PRE-ADMISSION TESTING ENDOSCOPY INSTRUCTIONS- [facility]-phone number:[phone]    ENDOSCOPY INSTRUCTIONS:   [] Bowel prep instructions reviewed. [x] Nothing by mouth after midnight, including gum, candy, mints, or water. Please follow your surgeons instructions if you are required to complete a bowel prep. Colonoscopy- no solid food-only clear liquids the day prior). [x] You may brush your teeth, gargle, but do NOT swallow water. [x] Do not wear makeup, lotions, powders, deodorant. Nail polish as directed by the nurse. [x] Arrange transportation with a responsible adult  to and from the hospital. If you do not have a responsible adult  to transport you, you will need to make arrangements with a medical transportation company (i.e. [employer]. A Uber/taxi/bus is not appropriate unless you are accompanied by a responsible adult ). Arrange for someone to be with you for the remainder of the day and for 24 hours after your procedure due to having had anesthesia. Who will be your  for transportation?____________daughter______   Who will be staying with you for 24 hrs after your procedure?_________daughter_________    PARKING INSTRUCTIONS:   [x] Arrival Time:____[address] door____________________  · [] Parking lot  \"I\" OR 1 is located on [facility] (the corner of [facility]). To enter, press the button and the gate will lift. A free token will be provided to exit the lot. One car per patient is allowed to park in this lot. All other cars are to park on [address] [place] [address] Street either in the parking garage or the handicap lot. [] To reach the [facility] lobby from [address] [place] [address], upon entering the hospital, take elevator B to the 3rd floor. EDUCATION INSTRUCTIONS:  [x] Bring a complete list of your medications, please write the last time you took the medicine, give this list to the nurse. [x] Take the following medications the morning of surgery with 1-2 ounces of water:   [x] Stop herbal supplements and vitamins 5 days before your surgery. [] DO NOT take any diabetic medicine the morning of surgery. Follow instructions for insulin the day before surgery. [] If you are diabetic and your blood sugar is low or you feel symptomatic, you may drink 1-2 ounces of apple juice or take a glucose tablet. The morning of your procedure, you may call the pre-op area if you have concerns about your blood sugar [phone]. [] Use your inhalers the morning of surgery. Bring your emergency inhaler with you day of surgery. [x] Follow physician instructions regarding any blood thinners you may be taking. WHAT TO EXPECT:  [x] The day of your procedure you will be greeted and checked in by the [employer].  In addition, you will be registered in the [facility] by a Patient Access Representative. Please bring your photo ID and insurance card. A nurse will greet you in accordance to the time you are needed in the pre-op area to prepare you for surgery. Please do not be discouraged if you are not greeted in the order you arrive as there are many variables that are involved in patient preparation. Your patience is greatly appreciated as you wait for your nurse. Please bring in items such as: books, magazines, newspapers, electronics, or any other items  to occupy your time in the waiting area. []  Delays may occur. Staff will make a sincere effort to keep you informed of delays. If any delays occur with your procedure, we apologize ahead of time for your inconvenience as we recognize the value of your time.

## 2021-01-29 NOTE — ANESTHESIA POSTPROCEDURE EVALUATION
Department of Anesthesiology  Postprocedure Note    Patient: Ma Gaucher  MRN: [id number]  YOB: 1935  Date of evaluation: 1/29/2021  Time:  12:14 PM     Procedure Summary     Date: 01/29/21 Room / Location: [facility] ENDO 03 / [facility]    Anesthesia Start: 4788 Anesthesia Stop: 8652    Procedure: EGD W/EUS FNA (N/A ) Diagnosis: (PANCREATIC LESION)    Surgeons: Sherren Battles, MD Responsible Provider: Sandra Parkinson MD    Anesthesia Type: MAC ASA Status: 3          Anesthesia Type: MAC    Helder Phase I: Helder Score: 10    Helder Phase II: Helder Score: 10    Last vitals: Reviewed and per EMR flowsheets.        Anesthesia Post Evaluation    Patient location during evaluation: PACU  Patient participation: complete - patient participated  Level of consciousness: awake and alert  Pain score: 0  Airway patency: patent  Nausea & Vomiting: no vomiting and no nausea  Complications: no  Cardiovascular status: hemodynamically stable  Respiratory status: spontaneous ventilation  Hydration status: stable

## 2021-01-29 NOTE — TELEPHONE ENCOUNTER
Patient's daughter left message stating that she was discharged from hospital on Tuesday and was instructed to stop taking valsartan-HCTZ and macrobid. They wanted to make sure that this was ok to do. Please advise.

## 2021-01-29 NOTE — OP NOTE
Endoscopic Ultrasound Procedure Note    Date of Procedure: 1/29/2021    Pre-procedure Diagnosis: Pancreatic head mass    Post-procedure Diagnosis: Pancreatic head cystic mass    Physician: Juan Elder MD    Assistant: None    Estimated Blood Loss: Estimated amount of blood loss is 2ml. Anesthesia: LMAC     Complications: None    Indications and History:  The patient is a 80 y.o. female. The risks, benefits, complications, treatment options and expected outcomes were discussed with the patient. The possibilities of reaction to medication, pulmonary aspiration, perforation of the gastrointestinal tract, bleeding requiring transfusion or operation, respiratory failure requiring placement on a ventilator and failure to diagnose a condition were discussed with the patient who freely signed the consent. Description of Procedure: The patient was taken to the endoscopy suite, identified as [facility] and the procedure verified as Endoscopic Ultrasound (EUS). A Time Out was held and the above information confirmed. The patient was positioned in the left lateral position with an oral bite block and anesthesia was provided for sedation and comfort. The  echoendoscope was passed to the gastric antrum. EGD/EUS findings:   Esophagus: normal   Stomach: normal   Duodenum: Stricture/external compression of the pylorus. Duodenal bulb could not be entered and visualized. Pancreas: At least 41 mm mass partially cystic in the head of the pancreas. The common bile duct courses through this mass with a metal stent in place. There is intermittent involvement of the superior mesenteric vein with greater than 180 degree abutment. There is associated dilation of the pancreatic duct proximal to the cystic mass to 5 mm. The mass was biopsied using an 22-gauge FNA needle making 4 passes. Bile Duct: CBD stent in place   Gallbladder: Not examined      Specimens:  1.  FNA pancreatic head mass The Patient was taken to the Endoscopy Recovery area in satisfactory condition.       Electronically signed by Alvaro Ball MD on 1/29/2021 at 11:09 AM

## 2021-01-29 NOTE — PROGRESS NOTES
Admitted to Same Day Surgery Unit. Preop instructions given to patient & family. Covid Test done:1/22/2021    Results:Negative    Self-quarantine guidelines followed since tested? Yes    Any unusual S/S or concerns expressed or observed? No

## 2021-01-29 NOTE — TELEPHONE ENCOUNTER
Spoke to daughter. Patient had an EGD today and BP was 144/61. She does not have a BP cuff at home so is unable to check it.

## 2021-01-29 NOTE — H&P
General Surgery History and Physical  T [facility]    Patient's Name/Date of Birth: Ena Reis / 1935    Date: January 29, 2021     Surgeon: Tre Spencer MD    PCP: Neftali Hale MD     Chief Complaint: Pancreatic head mass    HPI:   Ena Reis is a 80 y.o. female who presents for evaluation of pancreatic head mass. She was recently admitted at [facility] with painless jaundice. She had an ERCP with stent placement. At that time she was found to have a large cystic mass in the head of the pancreas causing compression of the common bile duct. Patient Active Problem List   Diagnosis    HTN (hypertension)    Hyperlipidemia    Pain of left lower extremity    Left knee pain    [facility]'s disease ([facility] [address]    Pneumonia    Cystic mass of pancreas    Obstructive jaundice due to cancer [facility])       Past Medical History:   Diagnosis Date    Asthma     Hyperlipidemia     Hypertension     Parkinson disease ([facility] [address]        Past Surgical History:   Procedure Laterality Date    COLONOSCOPY  10/2010    ERCP N/A 1/18/2021    ERCP STENT INSERTION performed by Candace Michelle MD at [address] [facility] ENDOSCOPY  2010       Allergies   Allergen Reactions    Codeine Nausea Only       The patient has a family history that is negative for severe cardiovascular or respiratory issues, negative for reaction to anesthesia. Time spent reviewing past medical, surgical, social and family history, vitals, nursing assessment and images. No changes from above documented history.     Social History     Socioeconomic History    Marital status:      Spouse name: Not on file    Number of children: Not on file    Years of education: Not on file    Highest education level: Not on file   Occupational History    Not on file   Social Needs    Financial resource strain: Not hard at all    Food insecurity     Worry: Never true Inability: Never true    Transportation needs     Medical: No     Non-medical: No   Tobacco Use    Smoking status: Never Smoker    Smokeless tobacco: Never Used   Substance and Sexual Activity    Alcohol use: Never     Alcohol/week: 5.0 standard drinks     Types: 5 Shots of liquor per week     Frequency: Never    Drug use: Never    Sexual activity: Never   Lifestyle    Physical activity     Days per week: Not on file     Minutes per session: Not on file    Stress: Not on file   Relationships    Social connections     Talks on phone: Not on file     Gets together: Not on file     Attends Jain service: Not on file     Active member of club or organization: Not on file     Attends meetings of clubs or organizations: Not on file     Relationship status: Not on file    Intimate partner violence     Fear of current or ex partner: Not on file     Emotionally abused: Not on file     Physically abused: Not on file     Forced sexual activity: Not on file   Other Topics Concern    Not on file   Social History Narrative    Not on file       I have reviewed relevant labs from this admission and interpretation is included in my assessment and plan    Review of Systems    A complete 10 system review was performed and are otherwise negative unless mentioned in the above HPI. Specific negatives are listed below but may not include all those reviewed.     General ROS: negative obtundation, AMS  ENT ROS: negative rhinorrhea, epistaxis  Allergy and Immunology ROS: negative itchy/watery eyes or nasal congestion  Hematological and Lymphatic ROS: negative spontaneous bleeding or bruising  Endocrine ROS: negative  lethargy, mood swings, palpitations or polydipsia/polyuria  Respiratory ROS: negative sputum changes, stridor, tachypnea or wheezing  Cardiovascular ROS: negative for - loss of consciousness, murmur or orthopnea  Gastrointestinal ROS: negative for - hematochezia or hematemesis Genito-Urinary ROS: negative for -  genital discharge or hematuria  Musculoskeletal ROS: negative for - focal weakness, gangrene  Psych/Neuro ROS: negative for - visual or auditory hallucinations, suicidal ideation    Physical exam:   BP (!) 119/57   Pulse 69   Temp 98.2 °F (36.8 °C) (Temporal)   Resp 17   Ht 5' 2\" (1.575 m)   Wt 108 lb (49 kg)   SpO2 95%   BMI 19.75 kg/m²   General appearance:  NAD, appears stated age  Head: NCAT, PERRLA, EOMI, red conjunctiva  Neck: supple, no masses, trachea midline  Lungs: Equal chest rise bilateral, no retractions, no wheezing  Heart: Reg rate  Abdomen: soft, nondistended  Skin; warm and dry, no cyanosis  Gu: no cva tenderness  Extremities: atraumatic, no focal motor deficits, no open wounds  Psych: No tremor, visual hallucinations      Radiology: I reviewed relevant abdominal imaging from this admission and that available in the EMR including CT abd/pel from 1/16/2021. My assessment is pancreatic head mass    Assessment:  [place] is a 80 y.o. female with pancreatic head mass, cystadenocarcinoma versus adenocarcinoma  Patient Active Problem List   Diagnosis    HTN (hypertension)    Hyperlipidemia    Pain of left lower extremity    Left knee pain    Parkinson's disease ([facility] [address]    Pneumonia    Cystic mass of pancreas    Obstructive jaundice due to cancer [facility])         Plan:  Proceed with EUS with biopsy  The procedure, risks, benefits and alternatives were discussed with the patient. She agrees to proceed.         Kee Ramirez MD  11:04 AM  1/29/2021

## 2021-01-29 NOTE — ANESTHESIA PRE PROCEDURE
 sodium chloride flush 0.9 % injection 10 mL  10 mL Intravenous 2 times per day Tre Spencer MD        sodium chloride flush 0.9 % injection 10 mL  10 mL Intravenous PRN Tre Spencer MD           Allergies: Allergies   Allergen Reactions    Codeine Nausea Only       Problem List:    Patient Active Problem List   Diagnosis Code    HTN (hypertension) I10    Hyperlipidemia E78.5    Pain of left lower extremity M79.605    Left knee pain M25.562    Parkinson's disease ([address] G20    Pneumonia J18.9    Cystic mass of pancreas K86.2    Obstructive jaundice due to cancer ([facility] [address] K83.1, C80.1       Past Medical History:        Diagnosis Date    Asthma     Hyperlipidemia     Hypertension     Parkinson disease ([facility] [address]        Past Surgical History:        Procedure Laterality Date    COLONOSCOPY  10/2010    ERCP N/A 1/18/2021    ERCP STENT INSERTION performed by Candace Michelle MD at [address] [place] [address]  2010       Social History:    Social History     Tobacco Use    Smoking status: Never Smoker    Smokeless tobacco: Never Used   Substance Use Topics    Alcohol use: Never     Alcohol/week: 5.0 standard drinks     Types: 5 Shots of liquor per week     Frequency: Never                                Counseling given: Not Answered      Vital Signs (Current):   Vitals:    01/29/21 0918   BP: (!) 168/71   Pulse: 72   Resp: 18   Temp: 97.1 °F (36.2 °C)   TempSrc: Temporal   SpO2: 97%   Weight: 108 lb (49 kg)   Height: 5' 2\" (1.575 m)                                              BP Readings from Last 3 Encounters:   01/29/21 (!) 168/71   01/19/21 (!) 147/66   01/18/21 (!) 116/58       NPO Status:                                                                                 BMI:   Wt Readings from Last 3 Encounters:   01/29/21 108 lb (49 kg)   01/15/21 108 lb (49 kg)   12/09/20 113 lb 3.2 oz (51.3 kg)     Body mass index is 19.75 kg/m².     CBC: Lab Results   Component Value Date    WBC 6.8 01/19/2021    RBC 3.25 01/19/2021    HGB 9.8 01/19/2021    HCT 31.2 01/19/2021    MCV 96.0 01/19/2021    RDW 13.6 01/19/2021     01/19/2021       CMP:   Lab Results   Component Value Date     01/19/2021    K 3.5 01/19/2021    K 3.7 01/15/2021     01/19/2021    CO2 22 01/19/2021    BUN 9 01/19/2021    CREATININE 0.6 01/19/2021    GFRAA >60 01/19/2021    LABGLOM >60 01/19/2021    GLUCOSE 90 01/19/2021    GLUCOSE 82 04/23/2012    PROT 5.6 01/19/2021    CALCIUM 8.2 01/19/2021    BILITOT 2.9 01/19/2021    ALKPHOS 727 01/19/2021     01/19/2021    ALT 43 01/19/2021       POC Tests: No results for input(s): POCGLU, POCNA, POCK, POCCL, POCBUN, POCHEMO, POCHCT in the last 72 hours. Coags:   Lab Results   Component Value Date    PROTIME 11.5 01/17/2021    INR 1.0 01/17/2021    APTT 27.3 01/17/2021       HCG (If Applicable): No results found for: PREGTESTUR, PREGSERUM, HCG, HCGQUANT     ABGs: No results found for: PHART, PO2ART, BBV0GGS, XJO2TLK, BEART, D5JVPELW     Type & Screen (If Applicable):  No results found for: LABABO, LABRH    Drug/Infectious Status (If Applicable):  No results found for: HIV, HEPCAB    COVID-19 Screening (If Applicable):   Lab Results   Component Value Date    COVID19 Not Detected 01/25/2021         Anesthesia Evaluation  Patient summary reviewed and Nursing notes reviewed   history of anesthetic complications: PONV. Airway: Mallampati: II  TM distance: >3 FB   Neck ROM: full  Mouth opening: < 3 FB Dental: normal exam         Pulmonary: breath sounds clear to auscultation  (+) asthma:                            Cardiovascular:  Exercise tolerance: good (>4 METS),   (+) hypertension:, hyperlipidemia        Rhythm: regular  Rate: normal                    Neuro/Psych:                ROS comment: Parkinson's disease GI/Hepatic/Renal:            ROS comment: Pancreatic lesion with obstructive jaundice. Endo/Other: Negative Endo/Other ROS                    Abdominal:           Vascular: negative vascular ROS. Anesthesia Plan      MAC     ASA 3       Induction: intravenous. Anesthetic plan and risks discussed with patient and child/children.       Plan discussed with CRNA and surgical team.                Nico Goodman MD   1/29/2021

## 2021-02-01 NOTE — TELEPHONE ENCOUNTER
Patient left message stating that she is having swelling in bilateral lower extremities. She has been keeping legs elevated and said this is not helping. Please advise.

## 2021-02-01 NOTE — TELEPHONE ENCOUNTER
Should see Dr. Yazmin Wright tomorrow, if cp or sob, to ER. Advise to limit salt consumption. Elevate feet above the level of the heart. Wear compression stockings if she has them.

## 2021-02-02 NOTE — PROGRESS NOTES
Chief Complaint   Patient presents with    Leg Swelling       HPI: Patient complains of leg swelling, mostly the left from the legs down. She denies SOB. She was taken off her BP meds in the  Hospital. Swelling has worsened since then. She has been trying to elevate them. It is not helping. L leg is getting red. Patient's past medical, surgical, social and/or family history reviewed, updated in chart, and are non-contributory (unless otherwise stated). Medications and allergies also reviewed and updated in chart.     Review of Systems:  Constitutional:  No fever, no fatigue, no chills, no headaches, no weight change  Dermatology:  No rash, no mole, no dry or sensitive skin  ENT:  No cough, no sore throat, no sinus pain, no runny nose, no ear pain  Cardiology:  No chest pain, no palpitations, + leg edema, no shortness of breath, no PND  Gastroenterology:  No dysphagia, no abdominal pain, no nausea, no vomiting, no constipation, no diarrhea, no heartburn  Musculoskeletal:  No joint pain, no leg cramps, no back pain, no muscle aches  Respiratory:  No shortness of breath, no orthopnea, no wheezing, no HARRELL, no hemoptysis  Urology:  No blood in the urine, no urinary frequency, no urinary incontinence, no urinary urgency, no nocturia, no dysuria  Vitals:    02/02/21 1402 02/02/21 1405   BP: (!) 151/80 130/86   Pulse: 75    Resp: 16    Temp: 97.5 °F (36.4 °C)    TempSrc: Temporal    SpO2: 97%    Weight: 120 lb (54.4 kg)    Height: 5' 2\" (1.575 m)        General:  Patient alert and oriented x 3, NAD, pleasant  HEENT:  Atraumatic, normocephalic, PERRLA, EOMI, clear conjunctiva, TMs clear, nose-clear, throat - no erythema  Neck:  Supple, no goiter, no carotid bruits, no lymphadenopathy  Lungs:  CTA B  Heart:  RRR, no murmurs, gallops or rubs  Abdomen:  Soft/nt/nd, + bowel sounds  Extremities:  2+ pitting edema, LLE with erythema  Skin: unremarkable    Assessment/Plan:      Ruma was seen today for leg swelling. Diagnoses and all orders for this visit:    Bilateral leg edema  -     US DUP LOWER EXTREMITIES BILATERAL VENOUS; Future    Other orders  -     furosemide (LASIX) 40 MG tablet; Take 1 tablet by mouth daily  -     doxycycline hyclate (VIBRAMYCIN) 100 MG capsule; Take 1 capsule by mouth 2 times daily for 10 days      As above. Call or go to ED immediately if symptoms worsen or persist.  No follow-ups on file. , or sooner if necessary. Educational materials and/or home exercises printed for patient's review and were included in patient instructions on his/her After Visit Summary and given to patient at the end of visit. Counseled regarding above diagnosis, including possible risks and complications,  especially if left uncontrolled. Counseled regarding the possible side effects, risks, benefits and alternatives to treatment; patient and/or guardian verbalizes understanding, agrees, feels comfortable with and wishes to proceed with above treatment plan. Advised patient to call with any new medication issues, and read all Rx info from pharmacy to assure aware of all possible risks and side effects of medication before taking. Reviewed age and gender appropriate health screening exams and vaccinations. Advised patient regarding importance of keeping up with recommended health maintenance and to schedule as soon as possible if overdue, as this is important in assessing for undiagnosed pathology, especially cancer, as well as protecting against potentially harmful/life threatening disease. Patient and/or guardian verbalizes understanding and agrees with above counseling, assessment and plan. All questions answered. Artur Sun MD  2/2/2021    I have personally reviewed and updated the chief complaint, HPI, Past Medical, Family and Social History, as well as the above Review of Systems.

## 2021-02-05 NOTE — PROGRESS NOTES
Hepatobiliary and Pancreatic Surgery Attending History and Physical    Patient's Name/Date of Birth: Ma Gaucher /1935 (35 y.o.)    Date: February 5, 2021     CC: Locally advanced unresectable pancreatic adenocarcinoma    HPI:  This is a 80 y.o. female with PMH below admitted 1/15/2021 with several weeks of fatigue feeling unwell, vague abdominal discomfort. She had an outpatient CT scan at another facility which reportedly showed a 4 cm pancreatic cyst with biliary ductal dilation and pancreatic ductal dilation. Her symptoms did not improve and she was for to the emergency department for evaluation. She is not a smoker. She is not a drinker. No prior episodes of pancreatitis. No family issues of pancreatic issues including pancreatic cancer. She has noticed she has had increasing jaundice since last 3 weeks as well as lighter stools and darker urine. While hospitalized she underwent a triphasic CT scan which showed greater than 180 degrees on the SMV involvement. She also had a metallic stent placed by Dr. Sultana Dockery. She also underwent an EUS with Dr. Dawit Conteh where the diagnosis of pancreatic adenocarcinoma was made. She is seeing me in followup. She states that she is weak and really doesn't want to undergo chemotherapy. She denies a family history of pancreatic cancer.      Past Medical History:   Diagnosis Date    Asthma     Hyperlipidemia     Hypertension     Parkinson disease ([facility] [address]        Past Surgical History:   Procedure Laterality Date    COLONOSCOPY  10/2010    ERCP N/A 1/18/2021    ERCP STENT INSERTION performed by Juan Isbell MD at [address] [facility] ENDOSCOPY  2010    UPPER GASTROINTESTINAL ENDOSCOPY N/A 1/29/2021    EGD W/EUS FNA performed by Sherren Battles, MD at [facility] ENDOSCOPY       Current Outpatient Medications   Medication Sig Dispense Refill    rOPINIRole (REQUIP) 0.5 MG tablet Take 0.5 mg by mouth nightly      furosemide (LASIX) 40 MG tablet Take 1 tablet by mouth daily 5 tablet 0    doxycycline hyclate (VIBRAMYCIN) 100 MG capsule Take 1 capsule by mouth 2 times daily for 10 days 20 capsule 0    omeprazole (PRILOSEC) 40 MG delayed release capsule Take 1 capsule by mouth daily 30 capsule 3    simvastatin (ZOCOR) 40 MG tablet TAKE 1 TABLET BY MOUTH AT  NIGHT 90 tablet 3    albuterol sulfate  (90 Base) MCG/ACT inhaler Inhale 2 puffs into the lungs every 6 hours as needed for Wheezing or Shortness of Breath 1 Inhaler 3    Multiple Vitamins-Minerals (THERAPEUTIC MULTIVITAMIN-MINERALS) tablet Take 1 tablet by mouth every morning      PARoxetine (PAXIL) 10 MG tablet Take 10 mg by mouth every morning   0    carbidopa-levodopa (SINEMET)  MG per tablet Take 1 tablet by mouth 3 times daily      aspirin 81 MG tablet Take 81 mg by mouth nightly       fish oil-omega-3 fatty acids 1000 MG capsule Take 2 g by mouth every morning       calcium carbonate-vitamin D (CALCIUM + D) 600-200 MG-UNIT TABS Take 1 tablet by mouth every morning       Coenzyme Q10 (CO Q 10) 100 MG CAPS Take 1 capsule by mouth every morning       Handicap Placard MISC by Does not apply route Pt cannot walk 200 ft without stopping Duration: Lifetime 1 each 0     No current facility-administered medications for this visit.         Allergies   Allergen Reactions    Codeine Nausea Only       Family History   Problem Relation Age of Onset    Heart Disease Mother [de-identified]    Heart Disease Father     Cancer Father 76        lung       Social History     Socioeconomic History    Marital status:      Spouse name: Not on file    Number of children: Not on file    Years of education: Not on file    Highest education level: Not on file   Occupational History    Not on file   Social Needs    Financial resource strain: Not hard at all   [employer] insecurity     Worry: Never true     Inability: Never true   [employer] needs     Medical: No     Non-medical: Physical Exam:  Vitals:    02/05/21 0953   BP: (!) 154/62   Pulse: 76   Resp: 18   Temp: 97.3 °F (36.3 °C)   SpO2: 97%       PSYCH: mood and affect normal, alert and oriented x 3  CONSTITUTIONAL: No apparent distress, comfortable  EYES: Sclera white, pupils equal round and reactive to light  ENMT:  Hearing normal, trachea midline, ears externally intact  LYMPH: no lympadenopathy in neck. Nolympadenopathy in groins  RESP: Breath sounds were clear and equal with no rales, wheezes, or rhonchi. Respiratory effort was normal with no retractions or use of accessory muscles. CV: Heart soundswere normal with a regular rate and rhythm. No pedal edema  GI/ Abdomen: Soft, nondistended, nontender, no guarding, no peritoneal signs    MSK: no clubbing/ no cyanosis/ gaitnormal       Assessment/Plan:  Locally advanced unresectable pancreatic adenocarcinoma (T4)  - we reviewed her CT scan together today with she and her family  - she is really not interested in chemotherapy and would like symptom management  - we discussed all of her options  - will consult palliative medicine  - if she wishes to pursue chemotherapy is being followed with the [facility]  - we also discussed that given her involvement on the SMV there is only a 3% chance she would make it to surgery    30 Minutes of which greater than 50% was spent counseling or coordinating her care. Thank you for the consultation allowing me to take part in Ms. Taylor's care.      Jovanny Cortés M.D.  2/5/2021  10:28 AM

## 2021-02-05 NOTE — TELEPHONE ENCOUNTER
Asked to speak to Dominic Ho and her daughter about Palliative Medicine as she has decided on no aggressive treatment for her cancer. Explained palliative care, explained Hospice service.  Appointment set for Dominic Ho per her request on 2/22/21

## 2021-02-08 NOTE — TELEPHONE ENCOUNTER
Vicci Aschoff called she is wondering if her mother should get the second covid shot that is made for feb 24. as well as an agency for hospice to use since she has 6 months or less.

## 2021-02-09 NOTE — TELEPHONE ENCOUNTER
Call from [facility], [address] [facility] daughter canceling appointment 2/22/21 as patient is going into Hospice Care.

## 2021-02-17 NOTE — TELEPHONE ENCOUNTER
Shanna Wan from [facility] left message requesting Rx for Zofran for patient. States she has been having intermittent nausea.  Rx pended for your approval.

## 2021-03-22 NOTE — TELEPHONE ENCOUNTER
Desiree Machado called from hospice she was seen over the weekend she stated her pain she was only taking 2 50 mg of tramadol she stated the medication is only helping for 3 hours. She is wondering if you would increase her medication 100 mg every 4 hours as needed for pain if so she needs a script to [facility]. Please advise.     [phone]

## (undated) DEVICE — BITEBLOCK 54FR W/ DENT RIM BLOX

## (undated) DEVICE — SPHINCTEROTOME: Brand: DREAMTOME™ RX 44

## (undated) DEVICE — GAUZE,SPONGE,POST-OP,4X3,STRL,LF: Brand: MEDLINE

## (undated) DEVICE — SYSTEM BX CAP BILI RAP EXCHG CAP LOK DEV COMPATIBLE W/ OLY

## (undated) DEVICE — GRADUATE TRIANG MEASURE 1000ML BLK PRNT

## (undated) DEVICE — SPONGE GZ W4XL4IN RAYON POLY FILL CVR W/ NONWOVEN FAB

## (undated) DEVICE — Device: Brand: BALLOON3

## (undated) DEVICE — RETRIEVAL BALLOON CATHETER: Brand: EXTRACTOR™ PRO RX

## (undated) DEVICE — NEEDLE ASPIR 22GA L2.4MM SHTH DIA1.52MM ENDO US EXPECT SLM

## (undated) DEVICE — ELECTRODE PT RET AD L9FT HI MOIST COND ADH HYDRGEL CORDED

## (undated) DEVICE — CANNULA NSL ORAL AD FOR CAPNOFLEX CO2 O2 AIRLFE

## (undated) DEVICE — BLOCK BITE 60FR RUBBER ADLT DENTAL